# Patient Record
Sex: FEMALE | HISPANIC OR LATINO | Employment: UNEMPLOYED | URBAN - METROPOLITAN AREA
[De-identification: names, ages, dates, MRNs, and addresses within clinical notes are randomized per-mention and may not be internally consistent; named-entity substitution may affect disease eponyms.]

---

## 2022-06-13 LAB
C TRACH DNA SPEC QL PROBE+SIG AMP: NEGATIVE
N GONORRHOEA DNA SPEC QL PROBE+SIG AMP: NEGATIVE
PAP-ABSTRACT: NORMAL
SPECIMEN DESCRIP: NORMAL
SPECIMEN DESCRIPTION: NORMAL

## 2022-06-14 ENCOUNTER — TRANSFERRED RECORDS (OUTPATIENT)
Dept: HEALTH INFORMATION MANAGEMENT | Facility: CLINIC | Age: 22
End: 2022-06-14

## 2022-07-24 ENCOUNTER — HEALTH MAINTENANCE LETTER (OUTPATIENT)
Age: 22
End: 2022-07-24

## 2022-09-22 ENCOUNTER — LAB (OUTPATIENT)
Dept: LAB | Facility: CLINIC | Age: 22
End: 2022-09-22
Payer: COMMERCIAL

## 2022-09-22 ENCOUNTER — OFFICE VISIT (OUTPATIENT)
Dept: SURGERY | Facility: CLINIC | Age: 22
End: 2022-09-22

## 2022-09-22 VITALS
HEIGHT: 64 IN | WEIGHT: 233.6 LBS | SYSTOLIC BLOOD PRESSURE: 126 MMHG | DIASTOLIC BLOOD PRESSURE: 80 MMHG | BODY MASS INDEX: 39.88 KG/M2

## 2022-09-22 DIAGNOSIS — R53.83 FATIGUE, UNSPECIFIED TYPE: ICD-10-CM

## 2022-09-22 DIAGNOSIS — E66.01 MORBID OBESITY (H): ICD-10-CM

## 2022-09-22 DIAGNOSIS — E66.01 MORBID OBESITY (H): Primary | ICD-10-CM

## 2022-09-22 LAB
ALBUMIN SERPL BCG-MCNC: 4.5 G/DL (ref 3.5–5.2)
ALP SERPL-CCNC: 80 U/L (ref 35–104)
ALT SERPL W P-5'-P-CCNC: 27 U/L (ref 10–35)
ANION GAP SERPL CALCULATED.3IONS-SCNC: 12 MMOL/L (ref 7–15)
AST SERPL W P-5'-P-CCNC: 28 U/L (ref 10–35)
BILIRUB SERPL-MCNC: 0.3 MG/DL
BUN SERPL-MCNC: 13.1 MG/DL (ref 6–20)
CALCIUM SERPL-MCNC: 9 MG/DL (ref 8.6–10)
CHLORIDE SERPL-SCNC: 104 MMOL/L (ref 98–107)
CREAT SERPL-MCNC: 0.66 MG/DL (ref 0.51–0.95)
DEPRECATED HCO3 PLAS-SCNC: 22 MMOL/L (ref 22–29)
ERYTHROCYTE [DISTWIDTH] IN BLOOD BY AUTOMATED COUNT: 14 % (ref 10–15)
FERRITIN SERPL-MCNC: 13 NG/ML (ref 6–175)
FOLATE SERPL-MCNC: 5.8 NG/ML (ref 4.6–34.8)
GFR SERPL CREATININE-BSD FRML MDRD: >90 ML/MIN/1.73M2
GLUCOSE SERPL-MCNC: 95 MG/DL (ref 70–99)
HBA1C MFR BLD: 5.4 % (ref 0–5.6)
HCT VFR BLD AUTO: 37.5 % (ref 35–47)
HDLC SERPL-MCNC: 54 MG/DL
HGB BLD-MCNC: 12.4 G/DL (ref 11.7–15.7)
LDLC SERPL DIRECT ASSAY-MCNC: 69 MG/DL
MCH RBC QN AUTO: 27 PG (ref 26.5–33)
MCHC RBC AUTO-ENTMCNC: 33.1 G/DL (ref 31.5–36.5)
MCV RBC AUTO: 82 FL (ref 78–100)
PLATELET # BLD AUTO: 255 10E3/UL (ref 150–450)
POTASSIUM SERPL-SCNC: 4.1 MMOL/L (ref 3.4–5.3)
PROT SERPL-MCNC: 7.4 G/DL (ref 6.4–8.3)
PTH-INTACT SERPL-MCNC: 53 PG/ML (ref 15–65)
RBC # BLD AUTO: 4.6 10E6/UL (ref 3.8–5.2)
SODIUM SERPL-SCNC: 138 MMOL/L (ref 136–145)
TSH SERPL DL<=0.005 MIU/L-ACNC: 1.78 UIU/ML (ref 0.3–4.2)
VIT B12 SERPL-MCNC: 391 PG/ML (ref 232–1245)
WBC # BLD AUTO: 7.3 10E3/UL (ref 4–11)

## 2022-09-22 PROCEDURE — 84630 ASSAY OF ZINC: CPT | Mod: 90

## 2022-09-22 PROCEDURE — 83036 HEMOGLOBIN GLYCOSYLATED A1C: CPT

## 2022-09-22 PROCEDURE — 83718 ASSAY OF LIPOPROTEIN: CPT

## 2022-09-22 PROCEDURE — 83970 ASSAY OF PARATHORMONE: CPT

## 2022-09-22 PROCEDURE — 82306 VITAMIN D 25 HYDROXY: CPT

## 2022-09-22 PROCEDURE — 82746 ASSAY OF FOLIC ACID SERUM: CPT

## 2022-09-22 PROCEDURE — 84443 ASSAY THYROID STIM HORMONE: CPT

## 2022-09-22 PROCEDURE — 84590 ASSAY OF VITAMIN A: CPT | Mod: 90

## 2022-09-22 PROCEDURE — 83721 ASSAY OF BLOOD LIPOPROTEIN: CPT

## 2022-09-22 PROCEDURE — 99000 SPECIMEN HANDLING OFFICE-LAB: CPT

## 2022-09-22 PROCEDURE — 80053 COMPREHEN METABOLIC PANEL: CPT

## 2022-09-22 PROCEDURE — 85027 COMPLETE CBC AUTOMATED: CPT

## 2022-09-22 PROCEDURE — 99204 OFFICE O/P NEW MOD 45 MIN: CPT | Performed by: FAMILY MEDICINE

## 2022-09-22 PROCEDURE — 84425 ASSAY OF VITAMIN B-1: CPT | Mod: 90

## 2022-09-22 PROCEDURE — 82607 VITAMIN B-12: CPT

## 2022-09-22 PROCEDURE — 36415 COLL VENOUS BLD VENIPUNCTURE: CPT

## 2022-09-22 PROCEDURE — 82728 ASSAY OF FERRITIN: CPT

## 2022-09-22 NOTE — PATIENT INSTRUCTIONS
If you are planning to have your surgery at [facility], please complete this online video seminar series.    Throughout this six-video series, you will be introduced to some members of our bariatric team, gain a better understanding of what obesity is, learn about the surgery process, review the benefits and risks of surgery, and see a glimpse of life after surgery.      Completion time of all six videos is about 25 minutes and you can watch them each separately as time allows.     Start by clicking on the link below and keep scrolling down to the [facility] Weight Loss Surgery Video.    [email or web address]    Please let us know if you have any questions or issues.         Welcome to [facility]!      We are grateful that you have chosen us to partner with you on your journey to better health!  We have included some very important information for you to read as you begin your journey towards weight loss surgery. We will discuss parts of this as you move closer to surgery but please reach out if you have any questions or concerns.      Please click on the following links and they will lead you to a printable version of each handout or copy into your browser to view/print at home:    Making Your Decision, Understanding Weight Loss Surgery  [email or web address]    A Roadmap to Your Weight Loss Surgery  [email or web address]    Honoring Choices - Your Rights  [email or web address]    Honoring Choices - [place] Health Care Directive (form that can be filled out)  [email or web address]      Insurance Coverage and Approval for Surgery  Every insurance plan is different.  Many companies approve benefits for surgery, but many have specific requirements that must be completed prior to being approved.    Verification of benefits is the patient's responsibility.  You will be responsible for any charges not covered by your  insurance plan - including, but not limited to copays, deductible, out of pocket, any amount over your cap limit, etc.  Using the guideline below, please contact your insurance plan (we recommend you call the Customer Service number on the back of your card).      Once all of the requirements for surgery are complete, you will see the surgeon.  Following this visit, we will submit your information to your insurance plan for Prior Authorization.  We strongly encourage you to submit any documentation that supports your weight loss attempts to us.    Questions that are important to ask your insurance company when you call them:    Are [facility] in my network?  Is bariatric surgery a covered benefit under my policy?  If so, what is my estimated out of pocket expense?  Are there any exclusions or cap limits on my plan for bariatric surgery?  If so, what are they?  What are the criteria necessary to be approved for bariatric surgery?  Do you require medically supervised weight loss visits for approval of surgery?  If yes, for how many months?  Within the last # of years?  Are the following procedures a covered benefit under my plan?  Laparoscopic Gastric Bypass (CPT Code 51801)  Laparoscopic Sleeve Gastrectomy (CPT Code 19938)  Nutrition/Dietitian Consult (CPT Code 00123  Nutrition/Dietitian Reassessment (CPT Code 85336  Psychological Assment (CPT Codes 56253 & 59772      Initial labs for Bariatric Surgery    Some lab orders were placed by your doctor in the [facility] system and can be drawn at any of our outpatient hospital labs or your clinic. If you do them at one of three hospitals ([facility] in [place], [facility] in [place], [facility] in [place]) you do not need an appointment but if you'd like to do them at a clinic, you will need to schedule an appointment with that clinic.       You will need to be fasting 10-12 hours prior (you can continue to drink water) and should  have them drawn sooner verses later so if any corrections need to be made we will have time to address them.      [facility] lab is open 7 am - 5:30 pm Monday through Friday and 9am-12:30 pm on Saturdays.  [facility] is open 7 am -6:30 pm Monday through Friday.     If you would like them done at a clinic outside the [email or web address] system, then we will need to know where to fax the orders.   If you have any questions or would like help scheduling a lab appointment at the [facility], please give us a call on the Bariatric Nurse Line at [phone].        [facility] for Bariatric Surgery  Eat 3 nutrient-rich meals each day     Don't skip meals--it will cause you to overeat later in the day! Space meals 4-6 hours apart    Eat around the same times each day to develop a routine eating schedule    Avoid snacking unless physically hungry.   Planned snacks: 1-2 times per day and no more than 150 calories    Eating protein and fiber (vegetables/fruits/whole grains) with meals helps you stay full     Choose foods with less than 10 grams of sugar and 5-10 grams of fat per serving to prevent excess calories and weight gain  Eat protein first    Protein helps with healing, maintaining muscle mass and good nutrition, and reducing hunger     For RNY Gastric Bypass, Sleeve Gastrectomy, and Duodenal Switch: aim for 60-80 grams of protein per day    Eat protein first, then veggies and the fruits or grains  Stop drinking 15-30 minutes before meals and wait 30-45 minutes after eating to drink    Drinking too soon before a meal may cause you to be too full to eat    Drinking too soon after you eat will cause the food to  wash  through your new stomach too quickly--leaving you hungry and likely to eat more    Eat S-[id number]    Take 20-30 minutes to eat each meal by taking small bites, chewing foods to applesauce consistency or 20-30 times before you swallow    Not chewing food properly can block the  opening of your pouch--causing pain, nausea, vomiting    Eating foods too fast can delay those full signals and increase overeating   Slow down your eating by smaller plates/bowls, toddler utensils, putting your fork/spoon down between bites and not watching TV/computer during meals!  Make a list of activities to prevent boredom, stress and emotional eating    Go for a walk or lift weights while watching your favorite TV show    Talk to a co-worker or email/call a friend     Keep your hands and mind busy with woodworking, puzzles, knitting or painting your nails    Practice deep breathing or meditation  Drink 64 ounces of 0-Calorie drinks between meals     Water    Zero calorie Propel , Vitamin Water Zero  or SoBe Lifewater , Crystal Light , Sugar-Free Barry-Aid , and other sugar-free lemonade or flavored peck    Decaffeinated, unsweetened coffee/ tea (1 cup or less per day)   Avoid Caffeine and Carbonation- NO STRAWS    Caffeine can irritate your new pouch, increase risk for ulcers, and can increase your appetite      Carbonation can lead to increased bloating/gas and discomfort   Work up to a total of 30-60 minutes of physical activity most days of the week    Helps with losing weight and preventing weight regain after surgery     Do a combination of cardio (walking/water exercises/biking/swimming) and strength training  Take daily vitamin/mineral supplements after surgery    Daily use of vitamins/minerals is necessary for the rest of your life      Psychological Evaluation for Bariatric Surgery      Why do I need a psychological evaluation before bariatric surgery?     The psychologist's main purpose is to provide the support and education to ensure you have the most successful outcome after surgery.   Preparing for bariatric surgery often involves changing behavior patterns. Working on these changes before surgery allows you to achieve the best results after surgery as some of these behaviors have been  entrenched for many years. In addition, your relationship with food may need to change. Psychologists are experts in behavior change and are there to guide and support you as you make these important changes.   A significant life change, like losing a lot of weight, may affect many areas of your life--self-concept, physical activity and relationships with others. Your psychologist will help you prepare to adjust to these changes in a healthy way.   If you have mental health symptoms, relationship struggles, or other challenges, your psychologist will suggest how to best address these concerns so that they do not interfere with your progress toward a healthier lifestyle.       Is the psychologist looking for reasons to say I can't have surgery?   The goal is to not find specific psychological problems. Instead, the psychologist will be working with your strengths to ensure you have the most optimal outcome after surgery.  There is no expectation that you will have everything figured out already or that you must have  perfect  behaviors before moving forward with surgery. Your psychologist is expecting that you will have some behavior changes that will need to occur concurrent with the surgery.   You can feel comfortable that the psychologist is not there to    you or your habits. The psychologist is there to provide support and encourage your progress.      What should I expect during the evaluation?   During the interview, which could take place over 2 or more sessions, the psychologist will ask about:   -weight history, current eating pattern and fluid intake, and previous attempts at weight loss   -activity level   -psychiatric history  -drug, alcohol and nicotine use   -social and developmental history  -support system   -current stressors and coping mechanisms   -understanding of the risks of surgery   -expectations for outcomes after surgery   You will complete various questionnaires that will focus on  coping strategies, eating behaviors, quality of life and adverse childhood experiences in order to provide additional information to inform the assessment.   Your psychologist will likely give you some  homework assignments  to practice as you prepare for surgery. This will be individually tailored to your specific needs.   Your psychologist will talk with you about some of the typical challenges that patients might encounter after surgery and how to prepare for these.   A final report will be generated and any treatment recommendations will be discussed with you and the bariatric team to determine next steps.   If you would like, you may have any support people (e.g., spouse) join a session of the evaluation to provide additional information.       Bariatric surgery offers a physical  tool  for weight management. Similarly, your work with the psychologist will provide you with some additional emotional and behavioral  tools  as you work toward your healthier lifestyle goals.      Support Group    Support and accountability is an important part of your weight loss journey and maintenance once you reach your health goals.  Because of this, we require that you attend at least one of our support groups before you meet with the surgeon so you get a feel for what they are like and hopefully establish a connection to others who are going through a similar process or have had surgery before so you can ask your questions.    We currently have two options for support group but they are in the virtual format only at this time.  Both groups are using Microsoft Teams for their platform and you can access it through the web or an ramirez that can be downloaded to a smart phone if you have one.      The Pre- and Post-op Connections Group is on the second Tuesday of the month from 6:30-8pm and is hosted by Hira Live, PhD.  If you are interested in this group, you will need to email him at [email or web address] each month and  then he will in turn send you the invitation to join.      We also have a Post-op focused Connections Group the 4th Wednesday of the month from 11am-12pm that is mostly geared toward post-operative patients who are past three months post-op.  This group is hosted by Lety Brewer, DIMITRIN, CBN, CIC and you can email her to join the group at [email or web address] each month and she will send you an invite similar to Hira's group.  If you decide you would like to be a regular attender at this group, we can add you to an automatic invitation list of people.    You can see more information about available support groups that the [facility] offers to our patients as well by following the link:    The following Support Group information can also be found on our website:  [email or web address]      Please let us know if you have any questions about all the information above and we will be talking more about this during future visits.     Thank you,   [facility] Team      Bariatric Task List    Fax:  Please fax all paperwork to: [phone] -     Status:  Is patient a candidate for bariatric surgery?:  patient is a candidate for bariatric surgery -     Cleared to schedule surgeon consult?:  not cleared to schedule surgeon consult -     Status:  surgery evaluation in process -     Surgeon: Pia -        Insurance: Insurance:  [facility] -     Other:  Please call Jaky Wilson to connect on insurance requirements at [phone] -        Patient Info: Initial Weight:  233.6# -     Date of Initial Weight/Height:  9/22/2022 -     Goal Weight (lbs):  233 -     Surgery Type:  sleeve gastrectomy -        Dietician Visits: Structured weight loss required by insurance?:  no structured weight loss required -     Clearance from dietician to see surgeon?:  Needed -        Psychological Evaluation: Psych eval:  Needed -     Other:    -        Lab Work: Complete  "Blood Count:  Needed -     Comprehensive Metabolic Panel:  Needed -     Vitamin D:  Needed -     PTH:  Needed -     Hgb A1c:  Needed -      Lipids: Needed -      TSH (LAURA, SCA, [place] MA): Needed -       Ferritin: Needed -       Folate: Needed -     Thiamine: Needed -     Vitamin A: Needed -     Vitamin B12: Needed -     Zinc: Needed -     Other:    -        PCP: Establish care with PCP:  Completed -        Patient Education:  Information Session:  Completed -     Given \"Making your decision\" handout?:  Yes -     Given \"A Roadmap to you Weight Loss Surgery\" handout?: Yes -     Given support group information?:    -  Yes -   Attended support group?:  Needed - Must attend at least once before surgery   Support plan in place?:  Completed -     Research consents signed?:  research consent not signed -        Additional Surgery Requirements: Review Coag plan:  Completed - standard   Birth control plan:  Completed - IUD   Gallstone prevention plan (Actigall for 6 months postop): Needed -     Other: Send us a copy of the most recent pap smear to the fax [phone] -        Final Tasks:  Before surgery online class:  Needed - 3 weeks prior with RD and RN   After surgery online class:  Needed - 1 week after with RD   History and Physical per clinic:   -  Needed -  Within one month of surgery once scheduled   Final labs per clinic: Needed -  Within one month of surgery once scheduled   Chest xray per clinic:   -  Needed -  Within one month of surgery once scheduled   Electrocardiogram (ECG) per clinic:   -  Needed -  Within one month of surgery once scheduled   Other:   -        Notes:   -           "

## 2022-09-22 NOTE — LETTER
"    2022         RE: Nereida Torres  [address]  [place] [address]        Dear Colleague,    Thank you for referring your patient, Nereida Torres, to the [facility] [place]. Please see a copy of my visit note below.    New Bariatric Surgery Consultation Note    2022    RE: Nereida Torres  MR#: [id number]  : 2000      Referring provider: No flowsheet data found.    Chief Complaint/Reason for visit: evaluation for possible weight loss surgery  Dear Shila Reddy CNM,     I had the pleasure of seeing your patient, Nereida Torres, to evaluate her obesity and consider her for possible weight loss surgery. As you know, Nereida Torres is 22 year old.  She has a height of 5' 4\", a weight of 233 lbs 9.6 oz, and calculated Body mass index is 40.1 kg/m .        HISTORY OF PRESENT ILLNESS:  Weight Loss History Reviewed with Patient 2022   How long have you been overweight? Since early childhood   What is the most that you have ever weighed? 244   What is the most weight you have lost? 6   I have tried the following methods to lose weight Watching portions or calories, Exercise, Slimfast   Have you ever had weight loss surgery? No       CO-MORBIDITIES OF OBESITY INCLUDE:     2022   I have the following health issues associated with obesity: Stress Incontinence       PAST MEDICAL HISTORY:  Past Medical History:   Diagnosis Date     Fatigue      Low back pain      Morbid obesity (H)        PAST SURGICAL HISTORY:  Past Surgical History:   Procedure Laterality Date     C/SECTION, LOW TRANSVERSE       D & C      retained placenta     TONSILLECTOMY         FAMILY HISTORY:   Family History   Problem Relation Age of Onset     Seizure Disorder Mother      No Known Problems Father      No Known Problems Sister      Congenital Anomalies Sister      No Known Problems Brother      Asthma " Brother        SOCIAL HISTORY:   Social History Questions Reviewed With Patient 9/20/2022   Which best describes your employment status (select all that apply) 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 Yes   Who do you have in your support network that can be available to help you for the first 2 weeks after surgery? Mom and sister   Who can you count on for support throughout your weight loss surgery journey? Mom and sister   Can you afford 3 meals a day?  Yes   Can you afford 50-60 dollars a month for vitamins? Yes       HABITS:     9/20/2022   How often do you drink alcohol? Monthly or less   If you do drink alcohol, how many drinks might you have in a day? (one drink = 5 oz. wine, 1 can/bottle of beer, 1 shot liquor) 1 or 2   Have you ever used any of the following nicotine products? E-cigarettes   If you previously used any of these products, what year did you quit? 2020   Have you or are you currently using street drugs or prescription strength medication for which you do not have a prescription for? No   Do you have a history of chemical dependency (alcohol or drug abuse)? No       PSYCHOLOGICAL HISTORY:   Psychological History Reviewed With Patient 9/20/2022   Have you ever attempted suicide? Never.   Have you had thoughts of suicide in the past year? No   Have you ever been hospitalized for mental illness or a suicide attempt? Never.   Do you have a history of chronic pain? No   Have you ever been diagnosed with fibromyalgia? No   Are you currently seeing a therapist or counselor?  No   Are you currently seeing a psychiatrist? No       ROS:     9/20/2022   Skin:  Skin fold rashes (groin or other folds), Leg swelling   HEENT: Headaches, Dizziness/lightheadedness, Teeth, dentures, or bridges needing repairs   Musculoskeletal: Back pain   Cardiovascular: Chest pain, Shortness of breath with activity   Pulmonary: Shortness of  "breath with activity, Excessively sleep during the day   Gastrointestinal: Diarrhea, Constipation   Genitourinary: Stress incontinence (losing urine when coughing, sneezing, etc.)   Hematological: Clotting problems   Neurological: None of the above   Female only: Loss of menstrual cycles, Birth control       EATING BEHAVIORS:     9/20/2022   Have you or anyone else thought that you had an eating disorder? Yes   If you answered yes to the previous eating disorder question, select the types that apply from this list: Binge Eating   If you answered \"Other\" to the type of eating disorder question above, please describe what it is: Sometimes I just want to eat a lot   Do you currently binge eat (eat a large amount of food in a short time)? Yes   Are you an emotional eater? Yes   Do you get up to eat after falling asleep? Yes       EXERCISE:     9/20/2022   How often do you exercise? 1 to 2 times per week   What is the duration of your exercise (in minutes)? 30 Minutes   What types of exercise do you do? walking, home gym   What keeps you from being more active?  I am as active as I can possbily be, Shortness of breath, Too tired       MEDICATIONS:  No current outpatient medications on file.       ALLERGIES:  No Known Allergies    LABS/IMAGING/MEDICAL RECORDS REVIEWED    PHYSICAL EXAM:  /80   Ht 1.626 m (5' 4\")   Wt 106 kg (233 lb 9.6 oz)   BMI 40.10 kg/m    Pleasant and in no distress. Accompanied by her 18 yo sister  Mallampati 1+ Neck 15.25\"  Skin: acanthosis at the neckline, rash in umbulicus  Heart Regular  Lungs Clear  Abdomen 41.5\"  Extr: 2+ pulses, no edema  Euthymic  Alert and oriented  Gait normal      In summary, Nereida has low back pain and fatigue in the setting of morbid obesity. Her Body mass index is 40.1 kg/m . This satisfies NIH criteria for bariatric surgery. Once Nereida has been cleared by our bariatric psychologist and our bariatric dietitian, completed initial lab work, attended one " support group, and satisfied insurance mandated visits if applicable, she  will be scheduled with Dr. Palacio for Bariatric Surgery Consultation. She is interested in the Sleeve Gastrectomy. Nereida understands that routine health care maintenance will need to be up to date prior to surgery. she verbalizes understanding of the process to surgery and expectations for the postoperative period including the need for lifelong lifestyle changes, vitamin supplementation, and laboratory monitoring.       Weight will need to be 233# prior to submitting for insurance approval.  Standard DVT protocol  Ursodiol for 6 months after surgery to prevent gallstone formation  Nereida was reminded to prevent pregnancy for 18-24 months post operatively. She has Mirena IUD.          Sincerely,          Liset Bhagat MD     Total time spent on the date of this encounter doing: chart review, review of test results, patient visit, physical exam, education, counseling, developing plan of care, and documenting = 45  minutes.         Again, thank you for allowing me to participate in the care of your patient.        Sincerely,        Liset Bhagat MD

## 2022-09-22 NOTE — PROGRESS NOTES
"New Bariatric Surgery Consultation Note    2022    RE: Nereida Torres  MR#: [id number]  : 2000      Referring provider: No flowsheet data found.    Chief Complaint/Reason for visit: evaluation for possible weight loss surgery  Dear Shila Reddy CNM,     I had the pleasure of seeing your patient, Nereida Torres, to evaluate her obesity and consider her for possible weight loss surgery. As you know, Nereida Torres is 22 year old.  She has a height of 5' 4\", a weight of 233 lbs 9.6 oz, and calculated Body mass index is 40.1 kg/m .        HISTORY OF PRESENT ILLNESS:  Weight Loss History Reviewed with Patient 2022   How long have you been overweight? Since early childhood   What is the most that you have ever weighed? 244   What is the most weight you have lost? 6   I have tried the following methods to lose weight Watching portions or calories, Exercise, Slimfast   Have you ever had weight loss surgery? No       CO-MORBIDITIES OF OBESITY INCLUDE:     2022   I have the following health issues associated with obesity: Stress Incontinence       PAST MEDICAL HISTORY:  Past Medical History:   Diagnosis Date     Fatigue      Low back pain      Morbid obesity (H)        PAST SURGICAL HISTORY:  Past Surgical History:   Procedure Laterality Date     C/SECTION, LOW TRANSVERSE       D & C      retained placenta     TONSILLECTOMY         FAMILY HISTORY:   Family History   Problem Relation Age of Onset     Seizure Disorder Mother      No Known Problems Father      No Known Problems Sister      Congenital Anomalies Sister      No Known Problems Brother      Asthma Brother        SOCIAL HISTORY:   Social History Questions Reviewed With Patient 2022   Which best describes your employment status (select all that apply) 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 Yes   Who do you " have in your support network that can be available to help you for the first 2 weeks after surgery? Mom and sister   Who can you count on for support throughout your weight loss surgery journey? Mom and sister   Can you afford 3 meals a day?  Yes   Can you afford 50-60 dollars a month for vitamins? Yes       HABITS:     9/20/2022   How often do you drink alcohol? Monthly or less   If you do drink alcohol, how many drinks might you have in a day? (one drink = 5 oz. wine, 1 can/bottle of beer, 1 shot liquor) 1 or 2   Have you ever used any of the following nicotine products? E-cigarettes   If you previously used any of these products, what year did you quit? 2020   Have you or are you currently using street drugs or prescription strength medication for which you do not have a prescription for? No   Do you have a history of chemical dependency (alcohol or drug abuse)? No       PSYCHOLOGICAL HISTORY:   Psychological History Reviewed With Patient 9/20/2022   Have you ever attempted suicide? Never.   Have you had thoughts of suicide in the past year? No   Have you ever been hospitalized for mental illness or a suicide attempt? Never.   Do you have a history of chronic pain? No   Have you ever been diagnosed with fibromyalgia? No   Are you currently seeing a therapist or counselor?  No   Are you currently seeing a psychiatrist? No       ROS:     9/20/2022   Skin:  Skin fold rashes (groin or other folds), Leg swelling   HEENT: Headaches, Dizziness/lightheadedness, Teeth, dentures, or bridges needing repairs   Musculoskeletal: Back pain   Cardiovascular: Chest pain, Shortness of breath with activity   Pulmonary: Shortness of breath with activity, Excessively sleep during the day   Gastrointestinal: Diarrhea, Constipation   Genitourinary: Stress incontinence (losing urine when coughing, sneezing, etc.)   Hematological: Clotting problems   Neurological: None of the above   Female only: Loss of menstrual cycles, Birth control  "      EATING BEHAVIORS:     9/20/2022   Have you or anyone else thought that you had an eating disorder? Yes   If you answered yes to the previous eating disorder question, select the types that apply from this list: Binge Eating   If you answered \"Other\" to the type of eating disorder question above, please describe what it is: Sometimes I just want to eat a lot   Do you currently binge eat (eat a large amount of food in a short time)? Yes   Are you an emotional eater? Yes   Do you get up to eat after falling asleep? Yes       EXERCISE:     9/20/2022   How often do you exercise? 1 to 2 times per week   What is the duration of your exercise (in minutes)? 30 Minutes   What types of exercise do you do? walking, home gym   What keeps you from being more active?  I am as active as I can possbily be, Shortness of breath, Too tired       MEDICATIONS:  No current outpatient medications on file.       ALLERGIES:  No Known Allergies    LABS/IMAGING/MEDICAL RECORDS REVIEWED    PHYSICAL EXAM:  /80   Ht 1.626 m (5' 4\")   Wt 106 kg (233 lb 9.6 oz)   BMI 40.10 kg/m    Pleasant and in no distress. Accompanied by her 18 yo sister  Mallampati 1+ Neck 15.25\"  Skin: acanthosis at the neckline, rash in umbulicus  Heart Regular  Lungs Clear  Abdomen 41.5\"  Extr: 2+ pulses, no edema  Euthymic  Alert and oriented  Gait normal      In summary, Nereida has low back pain and fatigue in the setting of morbid obesity. Her Body mass index is 40.1 kg/m . This satisfies NIH criteria for bariatric surgery. Once Nereida has been cleared by our bariatric psychologist and our bariatric dietitian, completed initial lab work, attended one support group, and satisfied insurance mandated visits if applicable, she  will be scheduled with Dr. Palacio for Bariatric Surgery Consultation. She is interested in the Sleeve Gastrectomy. Nereida understands that routine health care maintenance will need to be up to date prior to surgery. she verbalizes " understanding of the process to surgery and expectations for the postoperative period including the need for lifelong lifestyle changes, vitamin supplementation, and laboratory monitoring.       Weight will need to be 233# prior to submitting for insurance approval.  Standard DVT protocol  Ursodiol for 6 months after surgery to prevent gallstone formation  Nereida was reminded to prevent pregnancy for 18-24 months post operatively. She has Mirena IUD.          Sincerely,          Liset Bhagat MD     Total time spent on the date of this encounter doing: chart review, review of test results, patient visit, physical exam, education, counseling, developing plan of care, and documenting = 45  minutes.

## 2022-09-23 LAB — DEPRECATED CALCIDIOL+CALCIFEROL SERPL-MC: 16 UG/L (ref 20–75)

## 2022-09-25 LAB — ZINC SERPL-MCNC: 70.5 UG/DL

## 2022-09-26 LAB
ANNOTATION COMMENT IMP: NORMAL
RETINYL PALMITATE SERPL-MCNC: 0.02 MG/L
VIT A SERPL-MCNC: 0.46 MG/L

## 2022-09-28 LAB — VIT B1 PYROPHOSHATE BLD-SCNC: 88 NMOL/L

## 2022-10-03 ENCOUNTER — HEALTH MAINTENANCE LETTER (OUTPATIENT)
Age: 22
End: 2022-10-03

## 2022-10-06 DIAGNOSIS — E53.8 LOW SERUM VITAMIN B12: Primary | ICD-10-CM

## 2022-10-06 DIAGNOSIS — R79.89 LOW SERUM VITAMIN D: ICD-10-CM

## 2022-10-06 RX ORDER — CHOLECALCIFEROL (VITAMIN D3) 125 MCG
5000 CAPSULE ORAL DAILY
Qty: 90 CAPSULE | Refills: 3 | Status: ON HOLD | OUTPATIENT
Start: 2022-10-06 | End: 2023-04-19

## 2022-10-21 ENCOUNTER — VIRTUAL VISIT (OUTPATIENT)
Dept: SURGERY | Facility: CLINIC | Age: 22
End: 2022-10-21
Payer: COMMERCIAL

## 2022-10-21 DIAGNOSIS — Z71.3 NUTRITIONAL COUNSELING: ICD-10-CM

## 2022-10-21 DIAGNOSIS — E66.01 CLASS 3 SEVERE OBESITY DUE TO EXCESS CALORIES WITHOUT SERIOUS COMORBIDITY WITH BODY MASS INDEX (BMI) OF 40.0 TO 44.9 IN ADULT (H): Primary | ICD-10-CM

## 2022-10-21 DIAGNOSIS — E66.813 CLASS 3 SEVERE OBESITY DUE TO EXCESS CALORIES WITHOUT SERIOUS COMORBIDITY WITH BODY MASS INDEX (BMI) OF 40.0 TO 44.9 IN ADULT (H): Primary | ICD-10-CM

## 2022-10-21 PROCEDURE — 97802 MEDICAL NUTRITION INDIV IN: CPT | Mod: 95 | Performed by: DIETITIAN, REGISTERED

## 2022-10-21 NOTE — PROGRESS NOTES
Nereida Torres is a 22 year old who is being evaluated via a billable video visit.      How would you like to obtain your AVS? MyChart  If the video visit is dropped, the invitation should be resent by: Send to e-mail at: [email or web address]  Will anyone else be joining your video visit? No      Video Start Time: 2:00 pm      Initial Structured Weight Loss Supervised Diet Evaluation     Assessment:  Nereida is being seen today for initial RD nutritional evaluation. Patient has been unsuccessful with non-surgical weight loss methods and is interested in bariatric surgery. Today we reviewed current eating habits and level of physical activity, and instructed on the changes that are required for successful bariatric outcomes.    Surgery of interest per pt: LSG.    Workflow review:  Support Group: Completed.  Psychology:In progress.  Lab work:Completed.  SWL:Yes 2nd Visit      Weight goal: At or below initial.    Anthropometrics:  Pt's weight is 233 lbs  Initial weight: 233.6 lbs  Weight change: 0  BMI: There is no height or weight on file to calculate BMI.   Ideal body weight: 54.7 kg (120 lb 9.5 oz)  Adjusted ideal body weight: 75.2 kg (165 lb 12.7 oz)    Estimated RMR ([place]-St Jeor equation):    1799 kcals x 1.3 (light active) = 2339 kcals (for weight maintenance)    Medical History:  Patient Active Problem List   Diagnosis     Morbid obesity (H)     Fatigue      Diabetes: No  HbA1c:  No results found for: HGBA1C      Nutrition History  Food allergies/intolerances/cultural or religous food customs: No     Vitamins/Mineral currently taking: Vitamin D, Vitamin B12    Socioeconomic Status  Who does the grocery shopping for your household? shared    Who prepares your meals at home? Self     Diet Recall/Time  Breakfast: eggs with tomatoes and onions, tortillas   Lunch: meat (chicken) with tomatoes and onions or steak and potatoes or rice, fruit  Dinner: similar to lunch     Typical Snacks: almonds  or cheese or fruit    Eating out: rarely     Beverages  Coke, Water, Sparkling Water, Coffee (0-1 times/day)-with sugar and cream/milk     Exercise  Walking up to 60 minutes 3-4 times/week     Nutrition Diagnosis (PES statement)  Overweight/Obesity (NC 3.3) related to overeating and poor lifestyle habits as evidenced by patient's subjective statements and BMI of 40.1 kg/m2.     Intervention    Nutrition Education:   1. Provided general overview of diet and lifestyle modifications needed to be a deemed a safe candidate for bariatric surgery.   2. Educated patient on how to read a food label: choosing foods with than 10 grams fat and 10 grams sugar per serving to avoid dumping syndrome.  3. Dumping Syndrome: Described the mechanisms of syndrome, symptoms, and prevention tools from a dietary perspective.   4. Vitamins: Educated on post-op vitamin regimen including MVI+ 18 mg Fe two times a day, calcium citrate 400-600 mg two times a day, 3310-9995 mcg sublingual B12 daily, 5000 IU vitamin D3 daily.     Food/Nutrient Delivery:  5. Educated patient on eating three meals, with cutting out snacking.  6. Bariatric Plate: Patient and I discussed the importance of including a lean protein source (20-30 grams/meal), vegetables (included at lunch and dinner), one serving (15g) of carbohydrate, and limited added fat (1 tb/day) at each meal.   7. Educated patient on using a protein powder drink as a meal replacement and/or supplement after bariatric surgery.   8. Discussed importance of adequate hydration after surgery, with goal of at least 64 oz of fluids/day.  9. Addressed avoiding all carbonated, caffeinated and sweetened drinks to prepare for bariatric surgery.     Nutrition Counseling:  10. Mindful eating techniques: Encouraged slow meal pace, chewing foods to applesauce consistency for 20-30 minutes/meal.   11. Discussed  fluids 30 minutes before, during, and after meal to prevent dumping syndrome and discomfort  post bariatric surgery.       Instructions/Goals:     1. Start implementing bariatric surgery lifestyle modifications.      Handouts Provided:   [facility] Bariatric Surgery Nutrition Info      Monitor/Evaluation:  Pt. s target weight: no gain from initial visit, patient verbalized understanding.     Plan for next visit:     (Final Supervised Diet visit with RD) pre/post-op  diet progression, give review of surgery process.      Video-Visit Details    Type of service:  Video Visit    Video End Time:2:22 PM    Originating Location (pt. Location): Home    Distant Location (provider location):  [facility] SUR[facility] CL[facility] AND BARIATRICS [facility]     Platform used for Video Visit: Rosalina Malagon RD

## 2022-10-21 NOTE — LETTER
10/21/2022         RE: Nereida Torres  [address]  [place] [address]        Dear Colleague,    Thank you for referring your patient, Nereida Torres, to the [facility] SURGERY CLINIC AND BARIATRICS CARE [place]. Please see a copy of my visit note below.    Nereida Torres is a 22 year old who is being evaluated via a billable video visit.      How would you like to obtain your AVS? MyChart  If the video visit is dropped, the invitation should be resent by: Send to e-mail at: [email or web address].  Will anyone else be joining your video visit? No      Video Start Time: 2:00 pm      Initial Structured Weight Loss Supervised Diet Evaluation     Assessment:  Nereida is being seen today for initial RD nutritional evaluation. Patient has been unsuccessful with non-surgical weight loss methods and is interested in bariatric surgery. Today we reviewed current eating habits and level of physical activity, and instructed on the changes that are required for successful bariatric outcomes.    Surgery of interest per pt: LSG.    Workflow review:  Support Group: Completed.  Psychology:In progress.  Lab work:Completed.  SWL:Yes 2nd Visit      Weight goal: At or below initial.    Anthropometrics:  Pt's weight is 233 lbs  Initial weight: 233.6 lbs  Weight change: 0  BMI: There is no height or weight on file to calculate BMI.   Ideal body weight: 54.7 kg (120 lb 9.5 oz)  Adjusted ideal body weight: 75.2 kg (165 lb 12.7 oz)    Estimated RMR ([place]-St Jeor equation):    1799 kcals x 1.3 (light active) = 2339 kcals (for weight maintenance)    Medical History:  Patient Active Problem List   Diagnosis     Morbid obesity (H)     Fatigue      Diabetes: No  HbA1c:  No results found for: HGBA1C      Nutrition History  Food allergies/intolerances/cultural or religous food customs: No     Vitamins/Mineral currently taking: Vitamin D, Vitamin B12    Socioeconomic Status  Who does the  grocery shopping for your household? shared    Who prepares your meals at home? Self     Diet Recall/Time  Breakfast: eggs with tomatoes and onions, tortillas   Lunch: meat (chicken) with tomatoes and onions or steak and potatoes or rice, fruit  Dinner: similar to lunch     Typical Snacks: almonds or cheese or fruit    Eating out: rarely     Beverages  Coke, Water, Sparkling Water, Coffee (0-1 times/day)-with sugar and cream/milk     Exercise  Walking up to 60 minutes 3-4 times/week     Nutrition Diagnosis (PES statement)  Overweight/Obesity (NC 3.3) related to overeating and poor lifestyle habits as evidenced by patient's subjective statements and BMI of 40.1 kg/m2.     Intervention    Nutrition Education:   1. Provided general overview of diet and lifestyle modifications needed to be a deemed a safe candidate for bariatric surgery.   2. Educated patient on how to read a food label: choosing foods with than 10 grams fat and 10 grams sugar per serving to avoid dumping syndrome.  3. Dumping Syndrome: Described the mechanisms of syndrome, symptoms, and prevention tools from a dietary perspective.   4. Vitamins: Educated on post-op vitamin regimen including MVI+ 18 mg Fe two times a day, calcium citrate 400-600 mg two times a day, 3684-9903 mcg sublingual B12 daily, 5000 IU vitamin D3 daily.     Food/Nutrient Delivery:  5. Educated patient on eating three meals, with cutting out snacking.  6. Bariatric Plate: Patient and I discussed the importance of including a lean protein source (20-30 grams/meal), vegetables (included at lunch and dinner), one serving (15g) of carbohydrate, and limited added fat (1 tb/day) at each meal.   7. Educated patient on using a protein powder drink as a meal replacement and/or supplement after bariatric surgery.   8. Discussed importance of adequate hydration after surgery, with goal of at least 64 oz of fluids/day.  9. Addressed avoiding all carbonated, caffeinated and sweetened drinks to  prepare for bariatric surgery.     Nutrition Counseling:  10. Mindful eating techniques: Encouraged slow meal pace, chewing foods to applesauce consistency for 20-30 minutes/meal.   11. Discussed  fluids 30 minutes before, during, and after meal to prevent dumping syndrome and discomfort post bariatric surgery.       Instructions/Goals:     1. Start implementing bariatric surgery lifestyle modifications.      Handouts Provided:   [facility] Bariatric Surgery Nutrition Info      Monitor/Evaluation:  Pt. s target weight: no gain from initial visit, patient verbalized understanding.     Plan for next visit:     (Final Supervised Diet visit with RD) pre/post-op  diet progression, give review of surgery process.      Video-Visit Details    Type of service:  Video Visit    Video End Time:2:22 PM    Originating Location (pt. Location): Home    Distant Location (provider location):  [facility] SURGERY CLINIC AND BARIATRICS CARE [place]     Platform used for Video Visit: Rosalina Malagon RD        Again, thank you for allowing me to participate in the care of your patient.        Sincerely,        Emilia Malagon RD

## 2022-11-03 ENCOUNTER — TELEPHONE (OUTPATIENT)
Dept: SURGERY | Facility: CLINIC | Age: 22
End: 2022-11-03

## 2022-11-03 NOTE — TELEPHONE ENCOUNTER
Left a message as she was not present on any telehealth platform. She called back and emailed this clinician to say she was unable to connect in time and that she needed to reschedule.

## 2022-11-17 ENCOUNTER — VIRTUAL VISIT (OUTPATIENT)
Dept: SURGERY | Facility: CLINIC | Age: 22
End: 2022-11-17
Payer: COMMERCIAL

## 2022-11-17 DIAGNOSIS — E66.01 MORBID OBESITY (H): Primary | ICD-10-CM

## 2022-11-17 NOTE — PROGRESS NOTES
Video-Visit Details    Type of service:  Video Visit    Video Start Time (time video started): 12:30 PM    Video End Time (time video stopped): 1:16 PM    Originating Location (pt. Location): Home    Distant Location (provider location):  Off-site    Mode of Communication:  Video Conference via Zoom for Healthcare    Physician has received verbal consent for a Video Visit from the patient? Yes    Nereida would like to follow through with VSG for health reasons. She has struggled with her weight for much of her life and now is concerned with various comorbidities. 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 She is a boredom eater. She has good knowledge of surgery and good support. She will follow up and complete psychological testing (Uzbek version of MMPI-3; English packet). F50.9; E66.01    Hira Live, PhD

## 2022-12-08 ENCOUNTER — VIRTUAL VISIT (OUTPATIENT)
Dept: SURGERY | Facility: CLINIC | Age: 22
End: 2022-12-08
Payer: COMMERCIAL

## 2022-12-08 ENCOUNTER — TRANSFERRED RECORDS (OUTPATIENT)
Dept: HEALTH INFORMATION MANAGEMENT | Facility: CLINIC | Age: 22
End: 2022-12-08

## 2022-12-08 DIAGNOSIS — E66.01 MORBID OBESITY (H): Primary | ICD-10-CM

## 2022-12-08 NOTE — LETTER
12/8/2022         RE: Nereida Torres  [address]  [place] [address]        Dear Colleague,    Thank you for referring your patient, Nereida Torres, to the [facility] SURGERY CLINIC AND BARIATRICS CARE [place]. Please see a copy of my visit note below.    Video-Visit Details    Type of service:  Video Visit    Video Start Time (time video started): 12:25 PM    Video End Time (time video stopped): 12:52 PM    Originating Location (pt. Location): Home    Distant Location (provider location):  Off-site    Mode of Communication:  Video Conference via Zoom for Healthcare    Physician has received verbal consent for a Video Visit from the patient? Yes    Nereida has made quality changes in eating and lifestyle, but still needs to be more mindful about her eating. She will follow up with a list of activities and mindful eating strategies. She also needs to complete the packet of questionnaires. F50.9; E66.01    Hira Live, PhD          Again, thank you for allowing me to participate in the care of your patient.        Sincerely,        Hira Live, PhD

## 2022-12-08 NOTE — PROGRESS NOTES
Video-Visit Details    Type of service:  Video Visit    Video Start Time (time video started): 12:25 PM    Video End Time (time video stopped): 12:52 PM    Originating Location (pt. Location): Home    Distant Location (provider location):  Off-site    Mode of Communication:  Video Conference via Zoom for Healthcare    Physician has received verbal consent for a Video Visit from the patient? Yes    Nereida has made quality changes in eating and lifestyle, but still needs to be more mindful about her eating. She will follow up with a list of activities and mindful eating strategies. She also needs to complete the packet of questionnaires. F50.9; E66.01    Hira Live, PhD

## 2022-12-21 ENCOUNTER — VIRTUAL VISIT (OUTPATIENT)
Dept: SURGERY | Facility: CLINIC | Age: 22
End: 2022-12-21
Payer: COMMERCIAL

## 2022-12-21 DIAGNOSIS — E66.813 CLASS 3 SEVERE OBESITY DUE TO EXCESS CALORIES WITHOUT SERIOUS COMORBIDITY WITH BODY MASS INDEX (BMI) OF 40.0 TO 44.9 IN ADULT (H): Primary | ICD-10-CM

## 2022-12-21 DIAGNOSIS — E66.01 CLASS 3 SEVERE OBESITY DUE TO EXCESS CALORIES WITHOUT SERIOUS COMORBIDITY WITH BODY MASS INDEX (BMI) OF 40.0 TO 44.9 IN ADULT (H): Primary | ICD-10-CM

## 2022-12-21 DIAGNOSIS — Z71.3 NUTRITIONAL COUNSELING: ICD-10-CM

## 2022-12-21 PROCEDURE — 97803 MED NUTRITION INDIV SUBSEQ: CPT | Mod: GT | Performed by: DIETITIAN, REGISTERED

## 2022-12-21 NOTE — LETTER
12/21/2022         RE: Nereida Torres  [address]  [place] [address]        Dear Colleague,    Thank you for referring your patient, Nereida Torres, to the [facility] SURGERY CLINIC AND BARIATRICS CARE [place]. Please see a copy of my visit note below.    Nereida Torres is a 22 year old who is being evaluated via a billable video visit.      How would you like to obtain your AVS? MyChart  If the video visit is dropped, the invitation should be resent by: Send to e-mail at: [email or web address]  Will anyone else be joining your video visit? No          Follow Up Surgical Weight Loss Supervised Diet Evaluation    Assessment:  Pt. is being seen today for a follow up RD nutritional evaluation. Pt. has been unsuccessful with non-surgical weight loss methods and is interested in bariatric surgery. Today we reviewed current eating habits and level of physical activity, and instructed on the changes that are required for successful bariatric outcomes.    Surgery of interest per pt: LSG.    Workflow review:  Support Group: Completed.  Psychology:In progress.  Lab work:Completed.  SWL:Yes 3rd Visit      Weight goal: At or below initial.    Anthropometrics:  Pt's weight is 233 lbs  Initial weight: 233.6 lbs   Weight change: 0  BMI: There is no height or weight on file to calculate BMI.   Ideal body weight: 54.7 kg (120 lb 9.5 oz)  Adjusted ideal body weight: 75.2 kg (165 lb 12.7 oz)    Estimated RMR (Woodson-St Jeor equation):  1799 kcals x 1.3 (light active) = 2339 kcals (for weight maintenance)    Medical History:  Patient Active Problem List   Diagnosis     Morbid obesity (H)     Fatigue      Diabetes: No  HbA1c:  No results found for: HGBA1C    Progress over past month: Continues to take a daily Vitamin D as well as a Vitamin B12 supplement. Has been focusing on protein first at each meal along with pushing water intake, trying to make it her primary beverage  of choice.     Diet Recall/Time  Breakfast: shredded chicken, salad (tomatoes, onions, and peppers)  Lunch: meat (steak or chicken) with veggies (maybe made into a soup)  Dinner: similar to lunch or tuna with lettuce (made into a wrap/taco)    Typical Snacks: string cheese or walnuts      Meal duration: doing well, focusing on chewing the food well.      fluids by 30 minutes before, during meal, and waiting 30 minutes after meal before drinking fluids: Yes-hard to adjust to, but continues to work on it    Beverages  Water-continues to work on increased consumption    Exercise  Dancing, Push-Ups 5 days/week for at least 30 minutes    PES statement:   Overweight/Obesity (NC 3.3) related to overeating and poor lifestyle habits as evidenced by patient's subjective statements and BMI of 40.1 kg/m2.     Intervention    Nutrition Education:   1. Provided general overview of diet and lifestyle modifications needed to be a deemed a safe candidate for bariatric surgery.     Food/Nutrient Delivery:  2. Educated patient on eating three meals, with cutting out snacking.  3. Bariatric Plate: Patient and I discussed the importance of including a lean protein source (20-30 grams/meal), vegetables (included at lunch and dinner), one serving (15g) of carbohydrate, and limited added fat (1 tb/day) at each meal.   4. Educated patient on using a protein powder drink as a meal replacement and/or supplement after bariatric surgery.   5. Discussed importance of adequate hydration after surgery, with goal of at least 64 oz of fluids/day.  6. Addressed avoiding all carbonated, caffeinated and sweetened drinks to prepare for bariatric surgery.     Nutrition Counselin. Mindful eating techniques: Encouraged slow meal pace, chewing foods to applesauce consistency for 20-30 minutes/meal.   8. Discussed  fluids 30 minutes before, during, and after meal to prevent dumping syndrome and discomfort post bariatric surgery.    9. Discussed pre/post operative diet progression, post op vitamin regimen, gave review of surgery process.     Instructions/Goals:     1. Continue implementing bariatric surgery lifestyle modifications.      Handouts Provided:   [facility] Bariatric Surgery Nutrition Info      Monitor/Evaluation:  Pt. s target weight:  no gain from initial visit, pt. verbalized understanding.     Pt has completed all nutrition requirements and is well-informed of the dietary and physical activity requirements that are necessary for successful bariatric outcomes. This pt is an appropriate candidate for surgery from a nutrition standpoint at this time. The patient understands that surgery is a tool, not a cure, and post operative follow up is essential.     Plan for next visit:   Post Op Nutrition       Video-Visit Details    Type of service:  Video Visit    Video Start Time (time video started): 8:14 am     Video End Time (time video stopped): 8:24 am    Originating Location (pt. Location): Home        Distant Location (provider location):  Off-site    Mode of Communication:  Video Conference via [facility]    Physician has received verbal consent for a Video Visit from the patient? Yes      Emilia Malagon RD              Again, thank you for allowing me to participate in the care of your patient.        Sincerely,        Emilia Malagon RD

## 2022-12-21 NOTE — PROGRESS NOTES
Nereida BULL Young Torres is a 22 year old who is being evaluated via a billable video visit.      How would you like to obtain your AVS? MyChart  If the video visit is dropped, the invitation should be resent by: Send to e-mail at: [email or web address]  Will anyone else be joining your video visit? No          Follow Up Surgical Weight Loss Supervised Diet Evaluation    Assessment:  Pt. is being seen today for a follow up RD nutritional evaluation. Pt. has been unsuccessful with non-surgical weight loss methods and is interested in bariatric surgery. Today we reviewed current eating habits and level of physical activity, and instructed on the changes that are required for successful bariatric outcomes.    Surgery of interest per pt: LSG.    Workflow review:  Support Group: Completed.  Psychology:In progress.  Lab work:Completed.  SWL:Yes 3rd Visit      Weight goal: At or below initial.    Anthropometrics:  Pt's weight is 233 lbs  Initial weight: 233.6 lbs   Weight change: 0  BMI: There is no height or weight on file to calculate BMI.   Ideal body weight: 54.7 kg (120 lb 9.5 oz)  Adjusted ideal body weight: 75.2 kg (165 lb 12.7 oz)    Estimated RMR (Vance-St Jeor equation):  1799 kcals x 1.3 (light active) = 2339 kcals (for weight maintenance)    Medical History:  Patient Active Problem List   Diagnosis     Morbid obesity (H)     Fatigue      Diabetes: No  HbA1c:  No results found for: HGBA1C    Progress over past month: Continues to take a daily Vitamin D as well as a Vitamin B12 supplement. Has been focusing on protein first at each meal along with pushing water intake, trying to make it her primary beverage of choice.     Diet Recall/Time  Breakfast: shredded chicken, salad (tomatoes, onions, and peppers)  Lunch: meat (steak or chicken) with veggies (maybe made into a soup)  Dinner: similar to lunch or tuna with lettuce (made into a wrap/taco)    Typical Snacks: string cheese or walnuts      Meal  duration: doing well, focusing on chewing the food well.      fluids by 30 minutes before, during meal, and waiting 30 minutes after meal before drinking fluids: Yes-hard to adjust to, but continues to work on it    Beverages  Water-continues to work on increased consumption    Exercise  Dancing, Push-Ups 5 days/week for at least 30 minutes    PES statement:   Overweight/Obesity (NC 3.3) related to overeating and poor lifestyle habits as evidenced by patient's subjective statements and BMI of 40.1 kg/m2.     Intervention    Nutrition Education:   1. Provided general overview of diet and lifestyle modifications needed to be a deemed a safe candidate for bariatric surgery.     Food/Nutrient Delivery:  2. Educated patient on eating three meals, with cutting out snacking.  3. Bariatric Plate: Patient and I discussed the importance of including a lean protein source (20-30 grams/meal), vegetables (included at lunch and dinner), one serving (15g) of carbohydrate, and limited added fat (1 tb/day) at each meal.   4. Educated patient on using a protein powder drink as a meal replacement and/or supplement after bariatric surgery.   5. Discussed importance of adequate hydration after surgery, with goal of at least 64 oz of fluids/day.  6. Addressed avoiding all carbonated, caffeinated and sweetened drinks to prepare for bariatric surgery.     Nutrition Counselin. Mindful eating techniques: Encouraged slow meal pace, chewing foods to applesauce consistency for 20-30 minutes/meal.   8. Discussed  fluids 30 minutes before, during, and after meal to prevent dumping syndrome and discomfort post bariatric surgery.   9. Discussed pre/post operative diet progression, post op vitamin regimen, gave review of surgery process.     Instructions/Goals:     1. Continue implementing bariatric surgery lifestyle modifications.      Handouts Provided:   [facility] Bariatric Surgery Nutrition  Info      Monitor/Evaluation:  Pt. s target weight:  no gain from initial visit, pt. verbalized understanding.     Pt has completed all nutrition requirements and is well-informed of the dietary and physical activity requirements that are necessary for successful bariatric outcomes. This pt is an appropriate candidate for surgery from a nutrition standpoint at this time. The patient understands that surgery is a tool, not a cure, and post operative follow up is essential.     Plan for next visit:   Post Op Nutrition       Video-Visit Details    Type of service:  Video Visit    Video Start Time (time video started): 8:14 am     Video End Time (time video stopped): 8:24 am    Originating Location (pt. Location): Home        Distant Location (provider location):  Off-site    Mode of Communication:  Video Conference via [facility]    Physician has received verbal consent for a Video Visit from the patient? Yes      Emilia Malagon, RD

## 2022-12-22 ENCOUNTER — VIRTUAL VISIT (OUTPATIENT)
Dept: SURGERY | Facility: CLINIC | Age: 22
End: 2022-12-22
Payer: COMMERCIAL

## 2022-12-22 DIAGNOSIS — E66.01 MORBID OBESITY (H): Primary | ICD-10-CM

## 2022-12-22 NOTE — PROGRESS NOTES
Video-Visit Details    Type of service:  Video Visit    Video Start Time (time video started): 10 AM    Video End Time (time video stopped): 10:23 AM    Originating Location (pt. Location): Home    Distant Location (provider location):  Off-site    Mode of Communication:  Video Conference via [facility]    Nereida has made quality changes in eating and lifestyle and appears more mindful about her eating. She is now ready to proceed with surgery. A report was sent to the clinic. F50.9; E66.01    Hira Live, PhD

## 2022-12-22 NOTE — LETTER
12/22/2022         RE: Nereida Torres  [address]  [place] [address]        Dear Colleague,    Thank you for referring your patient, Nereida Torres, to the [facility] SURGERY CLINIC AND BARIATRICS CARE [place]. Please see a copy of my visit note below.    Video-Visit Details    Type of service:  Video Visit    Video Start Time (time video started): 10 AM    Video End Time (time video stopped): 10:23 AM    Originating Location (pt. Location): Home    Distant Location (provider location):  Off-site    Mode of Communication:  Video Conference via [facility]    Nereida has made quality changes in eating and lifestyle and appears more mindful about her eating. She is now ready to proceed with surgery. A report was sent to the clinic. F50.9; E66.01    Hira Live, PhD          Again, thank you for allowing me to participate in the care of your patient.        Sincerely,        Hira Live, PhD

## 2022-12-30 ENCOUNTER — TELEPHONE (OUTPATIENT)
Dept: SURGERY | Facility: CLINIC | Age: 22
End: 2022-12-30

## 2022-12-30 NOTE — TELEPHONE ENCOUNTER
Left a message requesting she fax us a copy of her updated pap.  Once we receive this she can have her surgical consult

## 2023-01-31 ENCOUNTER — TELEPHONE (OUTPATIENT)
Dept: SURGERY | Facility: CLINIC | Age: 23
End: 2023-01-31
Payer: COMMERCIAL

## 2023-01-31 NOTE — TELEPHONE ENCOUNTER
I was able to speak with patient along with an  to let patient know that she needs an updated pap smear.  I have let her know that she can request to have done this through her PCP.

## 2023-03-02 ENCOUNTER — OFFICE VISIT (OUTPATIENT)
Dept: SURGERY | Facility: CLINIC | Age: 23
End: 2023-03-02
Payer: COMMERCIAL

## 2023-03-02 VITALS
SYSTOLIC BLOOD PRESSURE: 110 MMHG | HEIGHT: 64 IN | WEIGHT: 237 LBS | DIASTOLIC BLOOD PRESSURE: 70 MMHG | BODY MASS INDEX: 40.46 KG/M2

## 2023-03-02 DIAGNOSIS — E66.01 CLASS 3 SEVERE OBESITY DUE TO EXCESS CALORIES WITHOUT SERIOUS COMORBIDITY WITH BODY MASS INDEX (BMI) OF 40.0 TO 44.9 IN ADULT (H): Primary | ICD-10-CM

## 2023-03-02 DIAGNOSIS — E66.813 CLASS 3 SEVERE OBESITY DUE TO EXCESS CALORIES WITHOUT SERIOUS COMORBIDITY WITH BODY MASS INDEX (BMI) OF 40.0 TO 44.9 IN ADULT (H): Primary | ICD-10-CM

## 2023-03-02 PROCEDURE — 99204 OFFICE O/P NEW MOD 45 MIN: CPT | Performed by: SURGERY

## 2023-03-02 RX ORDER — ACETAMINOPHEN 325 MG/1
975 TABLET ORAL ONCE
Status: CANCELLED | OUTPATIENT
Start: 2023-04-18 | End: 2023-03-02

## 2023-03-02 RX ORDER — HEPARIN SODIUM 5000 [USP'U]/.5ML
5000 INJECTION, SOLUTION INTRAVENOUS; SUBCUTANEOUS ONCE
Status: CANCELLED | OUTPATIENT
Start: 2023-04-18 | End: 2023-03-02

## 2023-03-02 RX ORDER — CELECOXIB 100 MG/1
400 CAPSULE ORAL
Status: CANCELLED | OUTPATIENT
Start: 2023-03-02

## 2023-03-02 RX ORDER — CEFAZOLIN SODIUM/WATER 2 G/20 ML
2 SYRINGE (ML) INTRAVENOUS
Status: CANCELLED | OUTPATIENT
Start: 2023-04-18

## 2023-03-02 RX ORDER — GABAPENTIN 100 MG/1
600 CAPSULE ORAL
Status: CANCELLED | OUTPATIENT
Start: 2023-03-02

## 2023-03-02 RX ORDER — CEFAZOLIN SODIUM/WATER 2 G/20 ML
2 SYRINGE (ML) INTRAVENOUS SEE ADMIN INSTRUCTIONS
Status: CANCELLED | OUTPATIENT
Start: 2023-04-18

## 2023-03-02 RX ORDER — ACETAMINOPHEN 325 MG/1
2 TABLET ORAL EVERY 6 HOURS PRN
Status: ON HOLD | COMMUNITY
Start: 2021-07-01 | End: 2023-04-19

## 2023-03-02 RX ORDER — ONDANSETRON 2 MG/ML
4 INJECTION INTRAMUSCULAR; INTRAVENOUS
Status: CANCELLED | OUTPATIENT
Start: 2023-04-18

## 2023-03-02 NOTE — LETTER
3/2/2023         RE: Nereida Torres  [address]  [place] [address]        Dear Colleague,    Thank you for referring your patient, Nereida Torres, to the [facility] SURGERY CLINIC AND BARIATRICS CARE [place]. Please see a copy of my visit note below.    HPI: Nereida Torres is a 22 year old female here today for consideration of metabolic and bariatric surgery. She is referred by Shila Reddy.  She has struggled with obesity for the past 15 years, noting difficulties with obesity through middle school and high school.  She states that she has never had success with self-directed low calorie diets, commercial product such as Slim fast.  While she has tried to lose weight with increased exercise and activity, she states that her obesity makes it harder to exercise and is impediments to doing more.  She became interested in bariatric surgery following the birth of her son who is now a year and a half old.  She hopes to gain increased ability to engage in physical activity following surgery.  She notes that she has already implemented some of the healthier eating and drinking habits required for success after surgery, and feel that these changes are something she can maintain as a permanent lifestyle adaptation..     Her bariatric comorbidities include fatigue.    Bariatric comorbidities not present include type 2 diabetes, hypertension, sleep apnea, GERD.      Allergies:Patient has no known allergies.    Past Medical History:   Diagnosis Date     Fatigue      Low back pain      Morbid obesity (H)        Past Surgical History:   Procedure Laterality Date     C/SECTION, LOW TRANSVERSE       D & C      retained placenta     TONSILLECTOMY         CURRENT MEDS:  Prior to Admission medications    Medication Sig Start Date End Date Taking? Authorizing Provider   acetaminophen (TYLENOL) 325 MG tablet Take 2 tablets by mouth every 6 hours as needed 7/1/21  Yes Reported,  "Patient   Cholecalciferol (VITAMIN D) 125 MCG (5000 UT) capsule Take 1 capsule (5,000 Units) by mouth daily 10/6/22 10/6/23 Yes Liset Burton MD   cyanocobalamin (VITAMIN B-12) 1000 MCG SUBL sublingual tablet Place 1 tablet (1,000 mcg) under the tongue daily 10/6/22 10/6/23 Yes Liset Burton MD         Social Connections: Not on file       Family History   Problem Relation Age of Onset     Seizure Disorder Mother      No Known Problems Father      No Known Problems Sister      Congenital Anomalies Sister      No Known Problems Brother      Asthma Brother         reports that she has never smoked. She has never used smokeless tobacco. She reports current alcohol use. She reports that she does not use drugs.    History   Smoking Status     Never   Smokeless Tobacco     Never       Review of Systems -  A complete ROS was reviewed and except for what is listed in the HPI above, all others are negative  PSYCHIATRIC: She has undergone a lifestyle assessment and has been deemed a good candidate for bariatric surgery by the psychologist.    /70   Ht 1.626 m (5' 4\")   Wt 107.5 kg (237 lb)   Breastfeeding No   BMI 40.68 kg/m      Wt Readings from Last 4 Encounters:   03/02/23 107.5 kg (237 lb)   09/22/22 106 kg (233 lb 9.6 oz)        Body mass index is 40.68 kg/m .    EXAM:  GENERAL: This is a well-developed 22 year old female who appears her stated age  HEAD & NECK: Grossly normal.  No visible goiter or scars  CARDIAC: Regular rate and rhythm  CHEST/LUNG: Normal respiratory effort  ABDOMEN: Obese.  No visible hernias or masses appreciated.  LYMPHATIC:  No significant adenopathy visualized.    EXTREMITIES: Grossly normal.  No evidence of chronic venous stasis.    NEUROLOGIC: Focally intact  INTEGUMENT: No open lesions or ulcers appreciated visually  PSYCHIATRIC: Normal affect. She has a good grasp on the nature of her obesity and the treatment options.    LABS:      Assessment/Plan: 22 year " old female who is an excellent candidate for bariatric and metabolic surgery.  After a careful conversation with the patient it was decided that a laparoscopic sleeve gastrectomy would be her best option.       I went over the surgery in detail with her.  I went over the nature of the operation and some of the potential consequences of the surgery.  I went over the expected hospital course and discussed laparoscopic versus open surgery, understanding that we will plan on doing this laparoscopically with the possibility of having to convert to an open operation.  I went over some of the risks and complications of the operation including, but not limited to, DVT, pulmonary emboli, pneumonia, postoperative bleeding, wound infection, staple line leak, intra-abdominal sepsis, and possible death.  I also went over some of the potential nutritional concerns such as vitamin B-12, iron, vitamin D, vitamin A, calcium and protein deficiencies.  I will also went over the need for lifelong nutritional surveillance.  This includes our regular scheduled follow up of a 1 week post op dietician visit, 2 week post op surgeon visit, an then additional dietician visits at 1 month, 3 months, and 9 months.  They are to follow up with a physician in our program at 6 months and 1 year, and annual thereafter.  The patient understands and wants to proceed with surgery.  We will submit for prior authorization.      Randall Palacio MD ,MD  [facility] Department of Surgery      Again, thank you for allowing me to participate in the care of your patient.        Sincerely,        Randall Palacio MD

## 2023-03-02 NOTE — PATIENT INSTRUCTIONS
[facility] Informed Consent for Bariatric Surgery from the clinic was given to the patient, reviewed, signed and sent for scanning.       Tiffanie Kinney CMA  [facility]  [place] [facility] [place]  [facility] [place] [address] [address]  Office: [phone]  Fax: [phone]

## 2023-03-02 NOTE — PROGRESS NOTES
HPI: Nereida Torres is a 22 year old female here today for consideration of metabolic and bariatric surgery. She is referred by Shila Reddy.  She has struggled with obesity for the past 15 years, noting difficulties with obesity through middle school and high school.  She states that she has never had success with self-directed low calorie diets, commercial product such as Slim fast.  While she has tried to lose weight with increased exercise and activity, she states that her obesity makes it harder to exercise and is impediments to doing more.  She became interested in bariatric surgery following the birth of her son who is now a year and a half old.  She hopes to gain increased ability to engage in physical activity following surgery.  She notes that she has already implemented some of the healthier eating and drinking habits required for success after surgery, and feel that these changes are something she can maintain as a permanent lifestyle adaptation..     Her bariatric comorbidities include fatigue.    Bariatric comorbidities not present include type 2 diabetes, hypertension, sleep apnea, GERD.      Allergies:Patient has no known allergies.    Past Medical History:   Diagnosis Date     Fatigue      Low back pain      Morbid obesity (H)        Past Surgical History:   Procedure Laterality Date     C/SECTION, LOW TRANSVERSE       D & C      retained placenta     TONSILLECTOMY         CURRENT MEDS:  Prior to Admission medications    Medication Sig Start Date End Date Taking? Authorizing Provider   acetaminophen (TYLENOL) 325 MG tablet Take 2 tablets by mouth every 6 hours as needed 7/1/21  Yes Reported, Patient   Cholecalciferol (VITAMIN D) 125 MCG (5000 UT) capsule Take 1 capsule (5,000 Units) by mouth daily 10/6/22 10/6/23 Yes Liset Burton MD   cyanocobalamin (VITAMIN B-12) 1000 MCG SUBL sublingual tablet Place 1 tablet (1,000 mcg) under the tongue daily 10/6/22 10/6/23 Yes Abdulaziz  "Liset Bhagat MD         Social Connections: Not on file       Family History   Problem Relation Age of Onset     Seizure Disorder Mother      No Known Problems Father      No Known Problems Sister      Congenital Anomalies Sister      No Known Problems Brother      Asthma Brother         reports that she has never smoked. She has never used smokeless tobacco. She reports current alcohol use. She reports that she does not use drugs.    History   Smoking Status     Never   Smokeless Tobacco     Never       Review of Systems -  A complete ROS was reviewed and except for what is listed in the HPI above, all others are negative  PSYCHIATRIC: She has undergone a lifestyle assessment and has been deemed a good candidate for bariatric surgery by the psychologist.    /70   Ht 1.626 m (5' 4\")   Wt 107.5 kg (237 lb)   Breastfeeding No   BMI 40.68 kg/m      Wt Readings from Last 4 Encounters:   03/02/23 107.5 kg (237 lb)   09/22/22 106 kg (233 lb 9.6 oz)        Body mass index is 40.68 kg/m .    EXAM:  GENERAL: This is a well-developed 22 year old female who appears her stated age  HEAD & NECK: Grossly normal.  No visible goiter or scars  CARDIAC: Regular rate and rhythm  CHEST/LUNG: Normal respiratory effort  ABDOMEN: Obese.  No visible hernias or masses appreciated.  LYMPHATIC:  No significant adenopathy visualized.    EXTREMITIES: Grossly normal.  No evidence of chronic venous stasis.    NEUROLOGIC: Focally intact  INTEGUMENT: No open lesions or ulcers appreciated visually  PSYCHIATRIC: Normal affect. She has a good grasp on the nature of her obesity and the treatment options.    LABS:      Assessment/Plan: 22 year old female who is an excellent candidate for bariatric and metabolic surgery.  After a careful conversation with the patient it was decided that a laparoscopic sleeve gastrectomy would be her best option.       I went over the surgery in detail with her.  I went over the nature of the operation " and some of the potential consequences of the surgery.  I went over the expected hospital course and discussed laparoscopic versus open surgery, understanding that we will plan on doing this laparoscopically with the possibility of having to convert to an open operation.  I went over some of the risks and complications of the operation including, but not limited to, DVT, pulmonary emboli, pneumonia, postoperative bleeding, wound infection, staple line leak, intra-abdominal sepsis, and possible death.  I also went over some of the potential nutritional concerns such as vitamin B-12, iron, vitamin D, vitamin A, calcium and protein deficiencies.  I will also went over the need for lifelong nutritional surveillance.  This includes our regular scheduled follow up of a 1 week post op dietician visit, 2 week post op surgeon visit, an then additional dietician visits at 1 month, 3 months, and 9 months.  They are to follow up with a physician in our program at 6 months and 1 year, and annual thereafter.  The patient understands and wants to proceed with surgery.  We will submit for prior authorization.      Randall Palacio MD ,MD  [facility] Department of Surgery

## 2023-03-03 ENCOUNTER — DOCUMENTATION ONLY (OUTPATIENT)
Dept: SURGERY | Facility: CLINIC | Age: 23
End: 2023-03-03
Payer: COMMERCIAL

## 2023-03-03 NOTE — PROGRESS NOTES
I have submitted a PA to [facility] for this patient to get approval of a LSG with Dr. Randall Palacio

## 2023-03-09 ENCOUNTER — DOCUMENTATION ONLY (OUTPATIENT)
Dept: SURGERY | Facility: CLINIC | Age: 23
End: 2023-03-09
Payer: COMMERCIAL

## 2023-03-09 ENCOUNTER — APPOINTMENT (OUTPATIENT)
Dept: INTERPRETER SERVICES | Facility: CLINIC | Age: 23
End: 2023-03-09
Payer: COMMERCIAL

## 2023-03-09 NOTE — PROGRESS NOTES
Nereida is scheduled for a LSG (06025) with Dr. Randall Palacio on Tuesday, April 18, 2023 @ 9:00 am.  Scheduled for pre op class on Wednesday, March 29, 2023 @ 12:30 pm.  She will have her pre op and testing done at [facility].    [facility] # [id number]  03/03/2023-09/02/2023

## 2023-03-09 NOTE — PROGRESS NOTES
Pre-op Class Checklist:    Initial Wt: 233 lb  AB Visit:    Wt Readings from Last 1 Encounters:   03/02/23 107.5 kg (237 lb)     Hemoglobin A1C   Date Value Ref Range Status   09/22/2022 5.4 0.0 - 5.6 % Final     Comment:     Normal <5.7%   Prediabetes 5.7-6.4%    Diabetes 6.5% or higher     Note: Adopted from ADA consensus guidelines.      CPAP: No   Smoking Cessation Date: Never smoker  Urine Nicotine Screen: N/A   Support Group: Yes   Anticoag Risk: Standard   Actigall: Need   Omeprazole: Need   Consent: Complete  Pharmacy: [id number] [address]  AT [facility]  Bariatrician: Liset Bhagat MD   Post-op appointment with Hira: No     Tasklist updated.    Sharee Vazquez RN, CBN  [facility]  P [phone]  F [phone]

## 2023-03-20 LAB
ALT SERPL-CCNC: 17 U/L (ref 6–29)
AST SERPL-CCNC: 16 U/L (ref 10–30)
CREATININE (EXTERNAL): 0.68 MG/DL (ref 0.5–0.96)
GFR ESTIMATED (EXTERNAL): 125 ML/MIN/1.73M2
GLUCOSE (EXTERNAL): 82 MG/DL (ref 65–99)
INR (EXTERNAL): 1 (ref 0.9–1.1)
POTASSIUM (EXTERNAL): 4.1 MMOL/L (ref 3.5–5.3)

## 2023-03-27 NOTE — PROGRESS NOTES
Patient attended group class to review pre-op instructions for upcoming surgery.    Discussed admission process, COVID restrictions and hospital course.  Pharmacy information packet given and explained. Patient was given exercises to work on post-op for maintaining muscle mass and strengthening.  Bariatric quiz reviewed in class. Discharge instructions and follow up appointments given and reviewed with pt at this time and patient verbalized understanding. She will have her H&P at [facility] in [facility] on 3/30/2023 and do her pre-op testing at that visit. Orders given to pt at class to take to her appointment.  Prescriptions for Omeprazole and Actigall as well as vitamins sent to the patient's pharmacy to be started after surgery.      Sharee Vazquez RN, CBN  [facility]  P [phone]  F [phone]

## 2023-03-27 NOTE — PATIENT INSTRUCTIONS
Patient name: Nereida Torres  Surgery:  Sleeve Gastrectomy  Surgery Date:  4/18/2023  Surgery Time: 9:00 am (*This time is just a tentative time and may end up changing.*)  Arrival Time to Hospital: 7:00 am (two hours prior to surgery time)  Surgeon: Randall Palacio MD       MONTH BEFORE SURGERY    Schedule and have Pre-operative History and Physical with your primary care in addition to the labs, CXR and EKG.  These should be completed within a month of surgery and no closer than 5 days.  ALL of the following tests must be completed per anesthesia and your surgeon prior to your surgery:    Chest Xray  EKG  Complete Blood Count with Differential  Complete Metabolic Panel  INR  APTT(PTT)  Urine Analysis (UA) with Urine Culture if UA abnormal  Hemaglobin A1c if hasn't been done in the last 3 months AND you are diabetic.  Urine Cotinine with Metabolites if you quit smoking within the last 6 months.    *  COVID testing- is no longer required prior to your surgery.  However, please let us know if you aren't feeling well or have tested positive for Covid-19 between now and your surgery date. Let us know if you have any questions regarding this.      2 WEEKS BEFORE SURGERY    Start Preoperative Liquid Diet per dietitian instructions      WEEK BEFORE SURGERY CHECKLIST       1. Continue Full Liquid Diet per dietitian instructions    2.   Purchase diet components for after surgery      3.   Arrange for someone to stay with you the first 1-2 nights you return home.     4.   Arrange for a ride home from the hospital.  We can't give an exact time for  because it depends on how you are doing with your drinking and pain control.  Most people are reaching their goals for discharge by lunch time and you will need to have a ride ready and waiting by 11 am.    5.   Do your grocery/vitamin shopping for before AND after surgery.    6.   Make sure you have a bowel movement if you haven t gone in a while. There is no  need for a bowel prep before surgery.       7.   Make sure you have picked up the prescriptions/supplements that were sent to your pharmacy - [id number] - [address]  AT [facility], [facility]     NEW PRESCRIPTIONS:  In preparation for surgery, we have prescribed some medication that you will need to  as soon as possible     A. Vitamins: Chewable Multivitamin and Vitamin B12 (if you weren't taking already)  - You can start taking the Multivitamin and B12 as soon as you pick this up from the pharmacy.  Some insurance companies will not cover the vitamins because they are available over the counter, so in those cases you will need to purchase them yourselves.  Do not take the morning of surgery.    B. Acid Reducer:  Omeprazole (Prilosec) - If not already taking, you will get a prescription to start when you get home from the hospital.  Tablets cannot be cut or crushed.  If a capsule, entire capsule contents may be sprinkled on a spoonful of applesauce and taken immediately without chewing.  If only on a full liquid diet, dump capsule contents into a spoonful of liquid and take without chewing.  May swallow whole again starting 6 weeks after surgery but can try swallowing sooner if having issues with opening into food.  Continue after surgery for at least 3 months even without symptoms of acid reflux. Do not take the morning of surgery.     C. Gallstone Reduction: Actigall (if needed) - Start two weeks after surgery.  Swallow whole with warm water, do not cut, crush, or open capsule up.  This is a medication that will help to prevent gallstones from forming during the first 6 months post-op of rapid weight loss.      BEFORE Surgery Medication Considerations:  Certain medications may need to be stopped before major surgery. Some medications may increase your risk of bleeding, others may change the way your blood clots, and other medications can affect your lab  work. The bariatric clinic will give you specific instructions about when to stop these medications.    This timeline shows when certain medications should be stopped before surgery: This is a general timeline, if your bariatric nurse or surgeon gives you other instructions, follow those specific instructions.    30 Days (One Month) Before Surgery  ? Birth control pills & patches that contain estrogen  ? You must use alternative forms of contraception  ? Hormone replacement therapy   o Premarin  o Testosterone  14 Days (Two Weeks) Before Surgery  ? Appetite control medications   o Phentermine  o Other: __________  ? Diuretics ( water pills )   ? Talk to your primary doctor.  You may need an alternative medication,  o Hydrochlorothiazide (HCTZ)  o Furosemide (Lasix )  o Bumetanide (Bumex )  o Spironolactone (Aldactone )  o Chlorthalidone  o Any blood pressure medication that is combined with a diuretic  ? Herbal supplements  o Fish oil or Omega 3   Homeopathic remedies  7 Days (One Week) Before Surgery  ? Anti-platelet medications and medications that help to prevent blood clots:  o Clopidogrel (Plavix ), Prasugrel (Effient ), Ticagrelor (Brilinta )  o Aspirin/Dipyridamole (Aggrenox ), Dipyridamole (Persantine )  o Cilostazol (Pletal )  ? All Non-Steroidal Anti-Inflammatory Drugs (NSAIDs):   o Non-prescription: Ibuprofen (Advil , Motrin , Nuprin ), Naproxen (Naprosyn , Aleve , Anaprox ),   o Prescription: Piroxicam (Feldene ), Sulindac (Clinoril ), Tolmentin (Tolectin ), Celecoxib (Celebrex ), Diclofenac (Voltaren ), Indomethacin (Indocin ), Nambumetone (Relafen ), Flurbiprofen (Ansaid ), Ketoprofen (Orudis ), Etodolac (Lodine ), Meloxicam (Mobic ), Oxaprozin (Daypro )  ? Aspirin (including low-dose (81mg) and chewable  baby aspirin )  ? Warfarin (Coumadin , Jantoven )  o When warfarin is restarted (usually 24-48 hrs after surgery), dosing and INR monitoring will be managed by the Bariatric Clinic for 4-6 weeks after  your surgery date.   The Day Before Surgery  ? Diabetes medications: Follow the instructions on the  Diabetes Management for the Bariatric Surgery Patient  form.  The Day of Surgery  ? Diabetes medications: Follow the instructions on the  Diabetes Management for the Bariatric Surgery Patient  form.      HOME MEDICATION RECOMMENDATIONS:  Below you will see your specific medication instructions.  Please share this information with your primary care so they can make adjustments to your medications if necessary.    Acetaminophen (Brand Name: Tylenol or MPAP)  You will likely be sent home on Tylenol to go with your other pain medications after surgery.  It is ok to cut/crush regular or extra strength tablets.  Make sure tablet is not long acting or extended release.  Do not take more than 3000mg in 24 hours.  If you decide to use liquid Tylenol at home, we recommend you use Adult strength because you will have to take less liquid to get the same effect but it can be harder to find in the stores and might have an easier time ordering it online prior to your surgery.  Dilute the medication 1:1 with water before taking the liquid version to prevent dumping or stomach upset.     Vitamin B Complex  Stop taking 2 weeks before surgery.  May use if ok with bariatric dietitian.     Vitamin D3   Ok to cut or crush tablet; if a softgel will likely need to swallow whole if small enough.  If capsule is too large, may need to take tablet form until able to swallow larger pills again.  Do not take the morning of surgery and don t restart again until instructed by the dietitian.  This must be 5000 units (125 mcg) daily after surgery.      Packing for the Hospital  When packing for the hospital, anticipate staying overnight. Make a checklist so that you don't forget things you really want to have with you.      Bring a folder with your printed patient handouts to keep handy and add discharge paperwork to if you want.    Bring your  "insurance card.    Bring a current medication list OR update list in [employer] (including vitamins, over-the-counter medication, eye drops, inhalers, patches, ointments and herbal products). Include the name, correct dose and the times you take them each day. List all allergies.    If you use a CPAP or BIPAP, bring it with you.  You may also want to bring a water bottle of the water you use in your machine.    Bring a copy of your health care or advanced directive document if you have one.    You may bring your own gown/pajamas and robe if you don't want to wear the hospital-supplied items once IV is disconnected.    Slippers or shoes should have a non-slip sole.    You may bring your own personal care items such as toothbrush, toothpaste, shampoo, denture cleanser, comb, skin care products, deodorant, make-up, and hair dryer.    If you wear a hearing aid, bring a labeled storage container and extra batteries.    If you wear glasses or contacts, bring a labeled case and saline for contacts. Do not plan to wear contact lenses the day of surgery. It is best to bring your glasses so that you can wear them in the recovery room. You cannot wear contact lenses during surgery.    Bring loose-fitting clothing to wear home. Bring telephone numbers (including work numbers) of family and friends.    You may want to bring a journal to document your thoughts and feelings.    You may not wear any jewelry on or in any area of your body to the operating room.     Leave money, jewelry, or any valuables at home.        DAY BEFORE SURGERY CHECKLIST      1. Clear liquid diet until 4 hours prior to your surgery     2.  Nothing to eat or drink 4 hours prior to your surgery time.       3.  Pack a couple preferred protein shakes to have available in the hospital -See approved list of liquids from dietitian      4.  Hibiclens or Exidine shower. Use a fresh, clean towel to dry off. See \"Showering Before Surgery handout\" below.       MORNING OF " "SURGERY CHECKLIST      1. Hibiclens or Exidine shower. Use a fresh, clean towel to dry off. See \"Showering Before Surgery handout\" below.     2.   Remove all jewelry and piercings.      3.   Take any medications you have been advised to take the morning of surgery with a sip of water. (See med list above)     4.   Arrive 2 hours prior to your scheduled surgery time.    Showering Before Surgery  [email or web address]     HOSPITAL STAY    Park in the [facility] Visitor Parking Lot in [place] and check in at the information desk where they will escort you to the [facility].     You will be allowed to have 2 support people with you in the pre-operative area at the hospital.  After surgery on the recovery unit, you can have up to 4 visitors and they must leave when visitor hours are over.     Meet your surgical team which includes an RN, CRNA, anesthesiologist and your surgeon.    IV will be started for fluid management, pain medication and possible antibiotics.    Anticoagulant therapy (blood thinner) will be given and continued until you are discharged.    Pneumatic compression stockings will be placed on your legs before surgery to help prevent blood clots. You can also move your feet up and down frequently to aid in circulation.    An incentive spirometer will be used to promote good lung expansion and prevent pneumonia. This must be within arm's reach of you, and you should be doing it ten times every hour while awake.      PAIN MEDICATION IN THE HOSPITAL  Treating pain and discomfort is important to your recovery.  Your surgeon will order pain medication for you in the hospital.  You will also get a take home prescription for pain medication after surgery.  Our goal is not pain free, but an acceptable level of discomfort; you should be able to move without pain limiting your activity    In the Preoperative area, you will receive additional pain treatment   Intrathecal Spinae Block (Duramorph)- which " is an ultrasound guided injection for pain medication   Ketorolac (Toradol )  This is an anti-inflammatory medication used to treat swelling and moderate pain  It is used only for 24 hours after surgery to decrease inflammation and pain  Given through your IV  Takes about 30 minutes to take effect  Common side effects: nausea, upset stomach  If your pain is not well controlled with this, you may be given a narcotic pain medication in addition to ketorolac    In Recovery, you will receive additional IV pain medication as needed and then you will be switched over to things you can take by mouth in preparation for going home.   Fentanyl   Hydromorphone (Dilaudid )    Common side effects: sleepiness, dizziness, upset stomach, constipation   Narcotic medications can alter your judgment, coordination and reaction time.  Do NOT drive or do anything that requires clear thinking and coordination until you have been completely off narcotic medications for at least 24 hours.    Use only to treat moderate to severe pain.  Ultram (Tramadol)  This is a non-narcotic prescription pain medication used for moderate to severe pain if needed.  Immediate release tablets can be cut or crushed.  Tramadol can cause or worsen seizures. Your risk of seizures is higher if you're taking other certain medications. These drugs include other opioid pain drugs or certain medications for depression, other mood disorders, or psychosis.  Tramadol oral tablet may cause drowsiness. You should not drive, use heavy machinery, or perform any dangerous activities until you know how this drug affects you.  Acetaminophen (Tylenol)   This will be giving by IV to start and then by mouth once you are ready  Use for mild pain or discomfort  Available without a prescription and comes in liquid, tablets, capsules and powder.  The adult strength powder packs can be helpful right after surgery and can be found online.  Maximum daily dosage: 3,000mg per 24  "hours  Works best as a scheduled dose for the first three days once you get home in addition to the gabapentin as the inflammation goes down  Gabapentin  Used together with the acetaminophen to provide pain relief.  Can be taken every 8 hours as needed  The medication works by calming the nerve cells that transmit pain signals to the brain.  This is best in liquid form right after surgery but can have a unpleasant taste.    * Important Reminder: Do NOT take NSAID or aspirin medications that are available without a prescription (examples: Ibuprofen, Motrin , Advil , Aleve , Naproxen)      OPERATING ROOM    Hover Mat will be used to move you from one surface to another.    Gunnar Hugger Gowns/Blankets are used to keep you warm.    Urinary catheters are not usually needed.    Surgery takes 45 minutes-3 hours depending on the procedure.    *Remember: How you go to sleep tends to be how you wake up - so pleasant thoughts are important!    RECOVERY ROOM    The surgeon will give an update to your family or support people as you are on your way to the recovery room.    You will wake up with a blood pressure cuff, oxygen and pulse monitor (pulse oximeter).    Frequent vital signs.    Pain Scale.      HOSPITAL ROOM    You will stay overnight on Patient Care Unit P2.    You will have a private room.    [facility] has been awarded an \"American College of Surgeons Center of Excellence\" partnered with the American Society for Metabolic and Bariatric Surgery and has a proven track records for quality care and good outcomes.     The staff has specialty training in the care of weight-loss surgery patients.       WHAT TO EXPECT AFTER SURGERY    You will start walking the day of surgery and continue several times a day, especially once you get home.    Drinking and eating will follow plan discussed with the dietitian.    Shower in the hospital.    Incentive Spirometer use and deep breathing/cough    Splinting your incision and wearing " the abdominal binder when moving may help with discomfort.    Discharge the day after surgery is expected for laparoscopic procedures after you are seen by your surgeon, nurse practitioner or physician s assistant, anesthesia, and possibly a pharmacist.    A nurse from the clinic will call you within 3 days after discharge.      ONCE YOU ARE HOME CHECKLIST      1. Continue your liquid intake daily and track     Oral intake:      12-4pm         4pm-8pm         8pm-Midnight         6am-10am       2.   Walks      12-4pm      4pm-8pm      8pm-Midnight      6am-10am       3.   Incentive Spirometer/ Deep Breaths and Coughing      ACTIVITY AFTER SURGERY    Walking several times a day, increasing endurance and energy level over time.    No lifting over 20 lbs. for the first 2 weeks (6 weeks for open procedure) and then let your body be your guide.    Can be the    in terms of chores at home.    Pushing and pulling uses the same core muscles and those activities may cause pain or discomfort.    Do each exercise 10-15 times, twice a day and increase weight when you can consistently do 12 repetitions of activity.    No swimming, bathing, or hot tub use until incisions are completely healed (scars).    Do not plan to fly or take a road trip within 1 to 3 months after surgery.    See handouts below for exercises that can be done after surgery.    Seated Exercises for Arms and Legs (can be done before or after surgery)  [email or web address]    Exercise Guidelines after Weight Loss Surgery (1st 4-6 weeks)  [email or web address]    Exercises after Weight Loss Surgery (strengthening, when no weight lifting restrictions - after 4-6 weeks)  [email or web address]      MEDICATIONS AFTER SURGERY    Here is a simple graph that might help makes things more clear for when to start certain medications after surgery.  (Zofran, Tylenol, Gabapentin and Hyoscyamine will be sent home with you  from the hospital as needed)    Medication Dose When to Start   Vitamin B12  1000 mcg Once a day under the tongue (sublingual), weekly nasal spray, or monthly injections Day after surgery   Chewable Multivitamin with   18 mg Iron  (Must also contain Vitamin A and Zinc)  NO GUMMIES 1 tablet twice daily Day after surgery   Omeprazole  20 mg 1 capsule daily - open and sprinkle on food or swallow with fluid Day after surgery. Take even if no acid reflux symptoms.   Zofran 4 mg  (if ordered) 4mg tablet every 8 hours as needed for nausea As needed after surgery   Tylenol 1000 mg every 6 hours for three days after surgery.  After first three days after surgery, switch to as needed and do not take more than 3000mg daily Once you get home   (you will be sent home from the hospital with this)   Hyoscyamine  (if ordered) 0.125 mg tablet every 4 hours as needed for stomach spasm pain As needed after surgery   Liquid Gabapentin  Must be kept in fridge   250 mg liquid 3 times a day for at least 3 days after surgery Once you get home   (you will be sent home from the hospital with this)   Actigall (Ursodiol)- 300 mg for gallbladder if you still have Twice daily - swallow whole with warm water Two weeks after surgery   Chewable Calcium Citrate 2 tablets twice daily Three weeks after surgery   Vitamin D - (125 mcg)  5000 units 1 daily Three weeks after surgery        LIFELONG VITAMINS:    First 3 Months after Surgery  Choose chewable, liquid or crushable forms of Multivitamin and Calcium Citrate and then you can switch to tablets you can swallow whole if you would like.    1.)   Sublingual Vitamin B12: Take 0411-2485 mcg 1 time daily    Also available in injectable form monthly.  Discuss with provider if interested.  2.)   Multivitamin with Iron: Take 1 multivitamin 2 times daily    Needs 18 mg of IRON per dose along with Vitamin A and Zinc in them  o Generic Children's Chewable multivitamin with iron (Equate, Up & Up brand,  etc.)  o Centrum   Chewable  o Centrum, Women's One-A-Day or Generic (after 3 months)    NO GUMMY Vitamins (these do not contain iron)  3.)   Calcium Citrate with Vitamin D: Take 400-600 mg 2 times daily (Start 3 weeks after surgery)    Bariatric Advantage: Citrate Lozenges (500mg)--2 Daily, or Chewy Bites (250 mg)--4 Daily  o www.BuscapÃ©  or 1-964.351.9870    Celebrate Calcium: Calcium Plus 500 (500mg)--2 Daily, or Calcet Creamy Bites (500 mg)--2 Daily, or Calcium Citrate Chews (250mg) - 4 Daily   o www.celebratevitaminsVitals (vitals.com)  or 1-543.216.3667    UNJURY Opurity: Calcium Citrate Plus (300mg)--3 Daily  o 5app  or 1-158.629.1467    Up-Elmo D: Nutrition Direct: Flavorless Calcium Powder (500 mg with 250 IU Vitamin D)  o www.amazon.com  or 1-109.155.5873--3 Scoops Daily    Wellesse Liquid Calcium Citrate--1 Tbsp.  (500 mg)--2 Times Daily  o [employer]    After 3 months post op:  Citracal Petites (or Generic version) (200mg) - 4 daily  o Any grocery store or Pharmacy  4.)   Vitamin D3 : Take 5000 IU Daily (Start 3 weeks after surgery)    This can remain a small gel cap    AFTER Surgery Medication Considerations:    The 3 main ideas:  1. Cut or crush all meds for 6 weeks after surgery  2. Long acting medications are not the best option anymore  3. Avoid NSAID medications for the rest of your life    1.  Cutting or crushing meds:  Before surgery, tablet size does not matter.  After surgery there will be swelling around your new stomach pouch or sleeve.  Therefore, it is important to limit the size of medications for the first six weeks after surgery.      What this means for you:    For the first six weeks after surgery, you will need to cut or crush tablets that are larger than   inch (about the size of an eraser on a standard pencil)    Some capsules may be opened and the contents sprinkled onto soft food.  Once sprinkled on food, eat immediately being careful not to chew.      You will  be given a pill cutter at the hospital    Refer to your medication list. This list will tell you which medications can be cut or crushed, and which capsules can be opened.     * Important Reminder: Discuss ALL of your medications with your primary care provider.    Your pre-op history and physical appointment is a good time to review your current medications.     2.  Long acting medications are not the best option anymore  Many types of bariatric surgery involve removing a portion of the small intestine. Long acting or extended release medications release slowly throughout the entire small intestine. Because your surgery has changed your stomach and/or intestine, you may not get the full effect of the long acting medication.  Short acting medications may work better for you after surgery. Talk with your doctor at your pre-op history and physical appointment if you are taking long acting medications. Your doctor can change prescriptions for long acting medications to short acting.   3.  Avoid NSAIDs for the rest of your life  NSAIDs are Non-Steroidal Anti-Inflammatory Drugs  The NSAID class of medications is used to relieve minor aches, pains or to treat fever. They are commonly used to treat pain caused by a cold, flu, sore throat, headache, and arthritis.  Some NSAIDs are available over-the-counter while others need a prescription from your doctor.     NSAIDs should be avoided after bariatric surgery because they can cause stomach ulcers, scarring or bleeding.  You will not be able to take any NSAID mediation for the rest of your life after bariatric surgery.  Below is a list of common NSAID medications:    OVER-THE-COUNTER NSAIDs    Ibuprofen  - Brand names Advil   , Motrin  , Nuprin     Naproxen - Brand names Aleve  , Naprosyn  , Anaprox      PRESCRIPTION NSAIDs   Celebrex   (Celecoxib)                                       Orudis   (Ketoprofen)  Mobic   (Meloxicam)                                            Lodine   (Etodolac)  Voltaren  (Diclofenac)                                        Ansaid   (Flurbiprofen)  Relafen   Nabumetone                                       Clinoril   (Sulindac)  Feldene   (Piroxicam)                                         Tolectin   Tolmentin  Indocin   (Indomethacin)                                    Daypro  (Oxaprozin)       After surgery, the only safe non-prescription pain reliever is acetaminophen (Tylenol ).  Acetaminophen is available in 325mg and 500mg tablets.  If acetaminophen does not control your pain, call your primary care provider to discuss other options.    4. Other medications you may have to avoid  Aspirin  Do not use aspirin for headaches, body aches, or muscle aches after bariatric surgery.  This is because aspirin can also cause stomach ulcers.  However, your primary care provider may tell you to take aspirin for certain conditions.  A maximum of 81mg of enteric coated aspirin (ex: Ecotrin ) once daily is usually okay to take if directed to take aspirin by your primary care doctor.   Be sure to take with food or milk.    Alendronate (Fosamax ), risedronate (Actonel ) (risedronate), ibandronate (Boniva ):  These are common osteoporosis medications that can cause erosions or ulcers in the esophagus and stomach.   Remind your primary care provider that you have had bariatric surgery and discuss other medication options.    Diuretics ( water pills )  These medications are used to treat high blood pressure.  They can also reduce swelling and water retention caused by various medical conditions.  Diuretics should not be used for patients who are having weight loss surgery.  Your diuretic will not be restarted immediately after surgery.  This is because it is often difficult to drink enough fluids after surgery to keep up with the fluid loss caused by the diuretic/ water pill .  Call your primary care provider to discuss alternative medications to treat high blood  pressure.      5. Medications for chronic conditions  It is likely that the need for some of your medications will change after weight loss surgery.    High Blood Pressure Medications   As you lose weight, your blood pressure may change. Talk with your primary care provider about how to manage your high blood pressure after surgery. You may need the dose of your blood pressure medication(s) adjusted.    Keep track of your blood pressure, and call your primary doctor if you have low blood pressure symptoms, such as: dizziness, faintness, weakness, light-headedness (especially when going from sitting to standing)    Diabetes Medications  As you lose weight, your blood sugars may change. Talk with your primary care provider about how to monitor and manage your diabetes after surgery. You may need the dose of your diabetes medication(s) adjusted.  Call your primary doctor if you have any of these symptoms.    Keep track of your blood sugars, and call your primary doctor if you have low blood sugar symptoms, such as: sweating, shaking, nervousness, headache, light-headedness, dizziness, weakness, or fainting      6. Other information    Medication Absorption  Some medications may not be absorbed as well after bariatric surgery.  Watch for changes in symptoms.  It is important to discuss your medications with your doctor if you think your medications are not working as well as they were before.  You may need to have medication doses adjusted.    Ursodiol (Actigall )  With rapid weight loss, your risk of gallstones increases.  If your still have your gallbladder after surgery, you will be given a prescription for Actigall  that you should begin taking 2 weeks after surgery.  Actigall  will help prevent gallstones from forming.  If you are prescribed this medication, you will usually take it for six months. We encourage you to take this tablet or capsule whole with warm or hot liquid to help it dissolve before it reaches  your stomach pouch.  This is medication is bigger than the   inch size restriction, but we will not have you cut/crush it due to it's unpleasant metallic taste. A pharmacist will speak with you more about this medication if it is ordered after surgery.    Common Side Effects: constipation, diarrhea, upset stomach, nausea, dizziness    Non-prescription medications:  You may need medication for seasonal allergies, colds, headaches, or minor aches and pains.  It is important to read the package label carefully.  Below are some helpful hints for choosing the right medication:     Look at the active ingredient(s) list to be sure the medication does not contain caffeine, NSAIDs or aspirin (maximum aspirin dose: 81 mg daily).    Avoid long-acting medication options.  Immediate release medications may work better because they are absorbed better.    It is okay to use liquid medications with the following cautions:  o Watch the sugar concentration.  High sugar content in medications could cause dumping syndrome.  Diluting the liquid medication with an equal amount of water will help prevent dumping syndrome.  o Do not use products that contain high fructose corn syrup, corn syrup, sugar, or sorbitol.  Look for sugar-free products as an alternative.    Chewable tablets or tablets that dissolve on your tongue ( oral-dissolving tablet ) are generally safe to use.      Supplements  Refer to the Vitamins & Supplements Handout provided to you by the Bariatric Dietitian for recommended doses and products.       After Bariatric Surgery Discharge Instructions  [facility]     Note: Ask your nurse to order your medications from the pharmacy. Be sure you have your medications with you when you leave.    What should my diet be for the first 2 weeks after surgery?  Follow the bariatric diet the dietitian discussed with you.    How much fluid should I drink?  Strive for 48-64 ounces daily.  Carry a  water bottle with you without a straw or sports top. Drink from it often.  Keep track of how much fluid you drink in a day.  Remember:  -Do not use straws, chew gum or suck on hard candies. They may cause painful gas.    -Sip, don't slurp when you drink.    -Practice small sips using a medicine cup for the first week postop.   -No ice or cold drinks. This could cause gas or spasms.   -No coffee, soda pop or drinks with caffeine. These may cause stomach pain.   -No alcohol. It is bad for your liver and will cause stomach pain.    How often should I do my deep breathing and coughing?  Use your incentive spirometer (small plastic breathing device) every hour while awake after you get home. Using the incentive spirometer helps you deep breath. Continuing to cough and deep breath will help prevent fevers and pneumonia.   If you do not have a fever after one week, you can stop using the incentive spirometer and discard it.     You can continue to take deep breaths without the incentive spirometer every one to two hours while awake for the month after surgery.    What kinds of activity can I do?  Get plenty of rest the first few days after surgery and try to balance rest and activity. You will need some time to recover - you may be more tired than you realize at first. You'll feel better and heal faster if you take good care of yourself.  For 2 weeks after surgery (Please review restrictions at your two week visit, they could change based on how well you are doing):   -Don't lift more than 20 pounds.   -Take 4-5 short walks every day.  -Don't jog, run, or do belly exercises.  Don't swim, bathe or use a hot tub until your cuts are healed (scabs are gone).    You may shower right away after surgery.  Don't plan to fly or take a road trip within the first 1 to 3 months after surgery.  You could get a blood clot in your legs. If you must travel, get up and move around every hour for at least 5 minutes before continuing on your  journey.  Your care team can help you decide when it's safe for you to travel.     What can I do for pain control?  You had major belly surgery that involves all layers of your belly muscles. Pain is expected, even for some as far out as 6-8 weeks after surgery. Moving, sneezing, coughing, and breathing will cause discomfort because these activities use your belly muscles.   Please see your after visit summary medication review for what pain medication will be continued, discontinued and newly started for you.    You can take opioid pain medicine if prescribed and if needed. Try to wean off from it as soon as you feel comfortable.   Do not drive while you are taking opioid pain medicine. This is dangerous.  The first three days after surgery, take your acetaminophen (Tylenol) scheduled every 6 hours.  After that you can take it as needed.  -Acetaminophen formulation options:   -Liquid   -Caplet (Cut caplet in half before taking)   -We will have you on a higher dose of Tylenol for the first three days post-op but then but then you should not exceed 3000mg per day.  -You will also take Tylenol for pain in place of the opioid pain medicine (check with your care team for specifics).   You can apply ice or heat to the affected area(s). Just remember to wrap the ice in something and limit icing sessions to 20 minutes. Excessive icing can irritate the skin or cause skin damage.  You can apply heat with a hot, wet towel or heating pad. Just like cold therapy, limit heat application to 20 minutes. Never sleep with a heating pad on. It could cause severe burns to your skin.  Wear your binder to support your belly muscles if you have one.  Take this off a little more each day and try to be off completely by 2 weeks after surgery. If you don't need your binder for comfort or support, you don't need to wear it.   You may not be able to sleep in a comfortable position for a few weeks after surgery. This is normal. You may be more  comfortable sleeping in a recliner or propped up with 3 pillows for the first couple of weeks after surgery.  You may feel gas pains in the upper chest or between your shoulder blades from the laparoscopic surgery which should get better with walking around, warm/cold packs and pain medication.  Do not take NSAID's (Non-Steroidal Anti-Inflammatory Drugs) (examples: ibuprofen, Motrin, Advil, Aleve, and Naproxen), aspirin, or use pain patches with NSAID's. They will increase your risk of bleeding or getting an ulcer.    Please call the clinic for any of the following pain concerns, we would like to talk to you:  -pain that does not improve with rest  -pain that gets worse and worse  -pain that is not controlled by your pain medicine  -a sudden severe increase in pain    What medications will I need to take after surgery?  You may be discharged with the following types of medications: antacids, pain medications, anti-nausea medications, etc.  Please see your after visit summary medication review for what will be continued, discontinued and newly started.  It is important to reduce the amount of acid in your new stomach for a couple of months after surgery while it is still healing. We will prescribe an acid reducer or antacid. Take it as directed. This will help prevent ulcers, heartburn and acid reflux.  If you took an acid reducer before you had surgery, your care team will let you know what acid reducer you will take after surgery.   It is okay to swallow any medications smaller than   inch in size.   -If it is larger than   inch, it may need to be cut, crushed, or in a liquid form. See the above medication section for what is most appropriate for you and your medications.    What should I know about my incisions (cuts)?  Your incisions are covered with white steri strips or butterfly tape and have band aids or gauze over the top. If you have gauze or band aids, they can come off in the hospital.  Leave your steri  strips on until they fall off on their own. If the steri strips don't fall off after 1 week, you can take them off. If they fall off earlier, replace them with clean band aids trying to avoid touching the incision itself.   If you have gauze covered by a clear dressing, remove 2-3 days after surgery or as directed by your care team.  You may shower in the hospital after surgery and can get your incision coverings wet.  Do not submerge in water (e.g. No baths, swimming pools, hot tubs) until your incisions are completely healed (scabs are gone).    Call the clinic if you have any of these signs or symptoms of infection:  -Redness around the site.  -Drainage that smells bad.  -Drainage that is thick yellow or green.  -An increase in pain around the incision site  -An increase in swelling around the incision site  -Heat or warmth around incision site   -Fever of 101.5  F (38.3  C) or higher when taken under the tongue.    -Chills    Will my urine or bowel movements change?   Your first bowel movements (stools) will likely be liquid. You may also notice old blood or a darker color (black or maroon color) in your bowel movements.  This is not unusual and usually goes away after the first week, if not sooner. You may not have a bowel movement for a week.   If you have not had a bowel movement for at least three days after your surgery date and are passing gas, you can use over the counter stool softeners.  Please stop taking the stool softeners and laxatives if your stools are loose.  Increasing fluids and activity as well as getting off narcotics will help prevent constipation.    Call the clinic if:   -You have stomach pain.   -You continue to have constipation.  -You have excessive bloating after walking and passing gas.  -You are having 3 or more loose stools a day.  You may have an infection that we need to treat.    How can I prevent dehydration if I feel nauseated (sick to my stomach) and vomit (throw up)?  "  Vomiting is not normal after surgery. If you continue to have nausea and vomiting, call the clinic.   Nausea can be a sign of dehydration. That is why it is very important to track your fluids. Do not nap more than one hour during the day. Set a timer to wake yourself up, if needed. Too much sleep will keep you from drinking enough fluid during the day and lead to dehydration.  No outside activity in hot, humid weather until you can drink 48 to 64 ounces of fluid in 24 hours. If you sweat a lot, your body may lose too much water.  If having difficulty getting in your fluids, try to take a 1 ounce sip of water (one medicine cup) every 15 minutes.  Set a timer to remind yourself.    If you notice any of the following symptoms, please don't delay in calling the clinic.  Early identification gives us more options for treatment:  -Dark colored urine  -Urinating (pass water) less than 2-3 times per day  -Lack of energy  -Nausea  -Dizziness  -Headache  -Metallic taste in your mouth    Call the clinic ANY TIME at [phone] if:  -Your pain medicine is not working.  -You have a fever ? 101.5 F.  -You have belly or left shoulder pain that gets worse and worse.  -You have a swollen leg with redness, warmth, or pain behind the knee or calf.  -You have chest pain   -You feel very short of breath.  -You have a sudden severe increase in heart rate.  -You have vomiting that gets worse and worse.  -You have constant nausea (feeling sick to your stomach) that does not go away with medication.  -You have trouble swallowing.  -You have an increasing feeling that \"something is not right\".  -You have hiccups that do not stop.  -You have any questions or concerns.    If you have to go to the Emergency Room, we prefer you go to the hospital that did your surgery. Please let them know that you had bariatric surgery and to notify your surgeon.    When should I go back to the clinic?  Follow up with your care team in 1 week.   If this " appointment was not already made, please call: [phone]    These are some additional handouts that are very important to read through and use after surgery.  They are good tools for after your surgery as you recover.      After Your Weight Loss Surgery  [email or web address]      Mindfulness Meditation  [email or web address]    Conscious Breathing  [email or web address]    Keeping Track of Your Fluids (for after surgery)  [email or web address]      AFTER SURGERY APPOINTMENT SCHEDULE    1 week with Dietitian (RD)  Dietitian Virtual Group Class scheduled for Tuesday 4/25/2023 from 1-2 pm with one of the dietitians. She will send you an email invitation to join the week of this class and the purpose of it is to check in with you and give you the next set of dietary instructions.  It will be using [employer] Teams, NOT in person or through [employer].    2 weeks with Surgeon  Surgeon video follow-up visit that will be done through [employer] which is already scheduled for you.    1 month with RD    3 months with RD    6 months with Bariatrician with labs    9 months with RD     12 months with Bariatrician with labs    18 months with RD or Bariatrician-possible labs    Annually after that with Bariatrician with labs and RD if needed      POP QUIZ    1.  How long do you need to be on full liquids after surgery? ________    2.  Name the 4 vitamin/mineral supplements you ll need to take for the rest of your life.    __________  _________  _________  __________    3.  How many grams of protein should you get in a day? ________    4.   Which meal would give you the most protein?    a.   1 oz. Cheese on 1 saltine cracker and 3 Tbsp applesauce.    b.     cup mashed potatoes and gravy with 2 Tablespoons applesauce    c.   3 Reduced Fat Wheat Thins, 1 oz. green beans, and 2 peeled grapes     5.   Concentrated urine, lack of energy, nausea, dizziness, headache, and a bad taste in  your mouth are signs of:    a. Dumping    b.  Dehydration   c. Clogging    d. None of the above    6.     What is the minimum amount of time recommended for exercise?    a.  30 minutes 7 days a week    b.  30 minutes 3 days per week    c.   1 hour 5 days per week    d.  None of the above    7.     Drinking liquids with meals can cause:    a.  Vomiting     b.   Weight gain   c.   Desire to Snack    d.   All of the above    8.     The long-term success of my weight loss surgery depends on:    a.      Me      b.   My Surgeon     c.  My Support Group     d.  My Family    9.      If you receive ice, carbonation or straws in the hospital post-op, you should:    a.      Eat and drink whatever is given to you by the hospital staff    b.      Remind the hospital staff you are not to have ice, straws or carbonation.    c.      Take the ice but not the carbonation or straws    d.      Call 911    10.  Name two possible complications after bariatric surgery:    ________________________   ______________________    11. Name two habits of healthy bariatric surgery patients:    _______________________  ________________________    True or False    12.  This operation for obesity will require routine visits with my surgeon for the first year, and then I will be okay on my own once I lose my weight and change my diet.    13.  Some individuals fail to lose weight or regain their weight because they resume snacking, binging, take in high fat or carbohydrate food & lack sufficient liquids and exercise.    14.  Women should avoid becoming pregnant for at least 18 months to 2 years following bariatric surgery.    15.  I can still drink 2-3 cans of soda or carbonated juices, water or other beverages after my surgery, as long as it is in moderation.    16.   Re-operation is sometimes necessary due to bleeding, hernias, ulceration, breakdown of stitches or staples, leakage and blockage of the intestines.    17.   I should call the clinic if I  develop increased redness or swelling in or around my incision, an oral temperature of 101.5 or greater, increasing severe abdominal or shoulder pain, increasing heart rate or anytime I just don t feel right.    18.   Gaining weight prior to surgery is dangerous because it can enlarge the liver, increase surgical risk and extend your recovery period.    19.   Once I lose my weight, I can drink alcohol as long as it is in moderation.    20.  It is possible I will have more emotional difficulties after surgery.    21.  I can take Aleve but not Ibuprofen or Aspirin after surgery.    22.  If I eat yogurt and drink milk daily, I don t have to take the recommended dose of calcium supplements    23.  Dumping Syndrome only occurs in gastric sleeve patients.    24.  It is possible you will gain weight or not lose much weight immediately after surgery.       We wish you the best and please let us know if you have any questions or concerns!    Lety Brewer RN and Sharee Vazquez RN    [facility]  [address], [address]  Phone: [phone]  Fax:  [phone]

## 2023-03-29 ENCOUNTER — ALLIED HEALTH/NURSE VISIT (OUTPATIENT)
Dept: SURGERY | Facility: CLINIC | Age: 23
End: 2023-03-29
Payer: COMMERCIAL

## 2023-03-29 VITALS — WEIGHT: 238 LBS | BODY MASS INDEX: 40.63 KG/M2 | HEIGHT: 64 IN

## 2023-03-29 DIAGNOSIS — K90.9 INTESTINAL MALABSORPTION, UNSPECIFIED TYPE: ICD-10-CM

## 2023-03-29 DIAGNOSIS — R63.4 RAPID WEIGHT LOSS: ICD-10-CM

## 2023-03-29 DIAGNOSIS — K21.9 ACID REFLUX: ICD-10-CM

## 2023-03-29 DIAGNOSIS — Z98.84 S/P BARIATRIC SURGERY: ICD-10-CM

## 2023-03-29 DIAGNOSIS — Z01.818 PRE-OP TESTING: ICD-10-CM

## 2023-03-29 DIAGNOSIS — E66.01 MORBID OBESITY (H): ICD-10-CM

## 2023-03-29 DIAGNOSIS — Z71.89 ENCOUNTER FOR PRE-BARIATRIC SURGERY COUNSELING AND EDUCATION: Primary | ICD-10-CM

## 2023-03-29 PROCEDURE — 99207 PR PREOP VISIT IN GLOBAL PKG: CPT

## 2023-03-29 RX ORDER — MULTIVIT-MIN/FOLIC/VIT K/LYCOP 400-300MCG
1 TABLET ORAL 2 TIMES DAILY
Qty: 180 TABLET | Refills: 3 | Status: SHIPPED | OUTPATIENT
Start: 2023-04-19 | End: 2023-04-17

## 2023-03-29 RX ORDER — URSODIOL 300 MG/1
300 CAPSULE ORAL 2 TIMES DAILY
Qty: 180 CAPSULE | Refills: 1 | Status: SHIPPED | OUTPATIENT
Start: 2023-05-02 | End: 2023-10-29

## 2023-03-29 NOTE — PROGRESS NOTES
Pt attended the pre-surgery class for bariatric surgery class and was educated on the pre and post surgery liquid diets. Patient received information via [employer] for the pre-op liquid diet and the post-op liquid diet. Discussed appropriate liquids and discussed portions. Reviewed appropriate calories, protein, and fluid goals for each stage before and after surgery. Educated on correct vitamins/minerals, dosage, and frequency to take after surgery. Provided grocery list and sample menu plan for each diet stage.      Pt will begin pre-op liquid diet 4/4/23 and will do clear liquids the day before surgery. Pt is scheduled for LSG on 4/18/23 and will then follow 1 week post-op liquid diet. Pt will follow up with RD 1-week post-op for education on the pureed and soft/regular diet stages.    Luna Dickson RD

## 2023-03-30 ENCOUNTER — TRANSFERRED RECORDS (OUTPATIENT)
Dept: MULTI SPECIALTY CLINIC | Facility: CLINIC | Age: 23
End: 2023-03-30

## 2023-03-30 ENCOUNTER — TRANSFERRED RECORDS (OUTPATIENT)
Dept: HEALTH INFORMATION MANAGEMENT | Facility: CLINIC | Age: 23
End: 2023-03-30
Payer: COMMERCIAL

## 2023-04-17 ENCOUNTER — ANESTHESIA EVENT (OUTPATIENT)
Dept: SURGERY | Facility: HOSPITAL | Age: 23
End: 2023-04-17
Payer: COMMERCIAL

## 2023-04-17 RX ORDER — SUMATRIPTAN 25 MG/1
25 TABLET, FILM COATED ORAL
COMMUNITY
End: 2023-04-17

## 2023-04-17 RX ORDER — ESCITALOPRAM OXALATE 5 MG/1
5 TABLET ORAL AT BEDTIME
COMMUNITY
End: 2023-04-17

## 2023-04-17 RX ORDER — FERROUS GLUCONATE 324(37.5)
1 TABLET ORAL DAILY
COMMUNITY
End: 2023-04-17

## 2023-04-17 RX ORDER — HYDROXYZINE HYDROCHLORIDE 25 MG/1
25 TABLET, FILM COATED ORAL 3 TIMES DAILY PRN
COMMUNITY
End: 2023-04-17

## 2023-04-18 ENCOUNTER — HOSPITAL ENCOUNTER (INPATIENT)
Facility: HOSPITAL | Age: 23
LOS: 1 days | Discharge: HOME | End: 2023-04-19
Attending: SURGERY | Admitting: SURGERY
Payer: COMMERCIAL

## 2023-04-18 ENCOUNTER — ANESTHESIA (OUTPATIENT)
Dept: SURGERY | Facility: HOSPITAL | Age: 23
End: 2023-04-18
Payer: COMMERCIAL

## 2023-04-18 DIAGNOSIS — R79.89 LOW SERUM VITAMIN D: ICD-10-CM

## 2023-04-18 DIAGNOSIS — E66.01 MORBID (SEVERE) OBESITY DUE TO EXCESS CALORIES (H): Primary | ICD-10-CM

## 2023-04-18 DIAGNOSIS — Z90.3 S/P GASTRIC SLEEVE PROCEDURE: ICD-10-CM

## 2023-04-18 DIAGNOSIS — E53.8 LOW SERUM VITAMIN B12: ICD-10-CM

## 2023-04-18 LAB
CREAT SERPL-MCNC: 0.71 MG/DL (ref 0.51–0.95)
GFR SERPL CREATININE-BSD FRML MDRD: >90 ML/MIN/1.73M2
GLUCOSE BLDC GLUCOMTR-MCNC: 81 MG/DL (ref 70–99)
PLATELET # BLD AUTO: 200 10E3/UL (ref 150–450)

## 2023-04-18 PROCEDURE — 250N000011 HC RX IP 250 OP 636: Performed by: SURGERY

## 2023-04-18 PROCEDURE — 88305 TISSUE EXAM BY PATHOLOGIST: CPT | Mod: TC | Performed by: SURGERY

## 2023-04-18 PROCEDURE — 258N000003 HC RX IP 258 OP 636: Performed by: NURSE PRACTITIONER

## 2023-04-18 PROCEDURE — 250N000011 HC RX IP 250 OP 636: Performed by: NURSE PRACTITIONER

## 2023-04-18 PROCEDURE — 43775 LAP SLEEVE GASTRECTOMY: CPT | Mod: AS | Performed by: NURSE PRACTITIONER

## 2023-04-18 PROCEDURE — 250N000009 HC RX 250: Performed by: NURSE ANESTHETIST, CERTIFIED REGISTERED

## 2023-04-18 PROCEDURE — 0DB64Z3 EXCISION OF STOMACH, PERCUTANEOUS ENDOSCOPIC APPROACH, VERTICAL: ICD-10-PCS | Performed by: SURGERY

## 2023-04-18 PROCEDURE — 43775 LAP SLEEVE GASTRECTOMY: CPT | Performed by: SURGERY

## 2023-04-18 PROCEDURE — 250N000009 HC RX 250: Performed by: ANESTHESIOLOGY

## 2023-04-18 PROCEDURE — 272N000001 HC OR GENERAL SUPPLY STERILE: Performed by: SURGERY

## 2023-04-18 PROCEDURE — 88305 TISSUE EXAM BY PATHOLOGIST: CPT | Mod: 26 | Performed by: PATHOLOGY

## 2023-04-18 PROCEDURE — 250N000011 HC RX IP 250 OP 636: Performed by: NURSE ANESTHETIST, CERTIFIED REGISTERED

## 2023-04-18 PROCEDURE — 88342 IMHCHEM/IMCYTCHM 1ST ANTB: CPT | Mod: 26 | Performed by: PATHOLOGY

## 2023-04-18 PROCEDURE — 999N000141 HC STATISTIC PRE-PROCEDURE NURSING ASSESSMENT: Performed by: SURGERY

## 2023-04-18 PROCEDURE — 82565 ASSAY OF CREATININE: CPT | Performed by: NURSE PRACTITIONER

## 2023-04-18 PROCEDURE — 258N000003 HC RX IP 258 OP 636: Performed by: ANESTHESIOLOGY

## 2023-04-18 PROCEDURE — 250N000011 HC RX IP 250 OP 636: Performed by: ANESTHESIOLOGY

## 2023-04-18 PROCEDURE — 85049 AUTOMATED PLATELET COUNT: CPT | Performed by: SURGERY

## 2023-04-18 PROCEDURE — 370N000017 HC ANESTHESIA TECHNICAL FEE, PER MIN: Performed by: SURGERY

## 2023-04-18 PROCEDURE — 710N000009 HC RECOVERY PHASE 1, LEVEL 1, PER MIN: Performed by: SURGERY

## 2023-04-18 PROCEDURE — 360N000077 HC SURGERY LEVEL 4, PER MIN: Performed by: SURGERY

## 2023-04-18 PROCEDURE — 36415 COLL VENOUS BLD VENIPUNCTURE: CPT | Performed by: SURGERY

## 2023-04-18 PROCEDURE — 250N000025 HC SEVOFLURANE, PER MIN: Performed by: SURGERY

## 2023-04-18 PROCEDURE — 258N000003 HC RX IP 258 OP 636: Performed by: NURSE ANESTHETIST, CERTIFIED REGISTERED

## 2023-04-18 PROCEDURE — 250N000013 HC RX MED GY IP 250 OP 250 PS 637: Performed by: SURGERY

## 2023-04-18 PROCEDURE — 120N000001 HC R&B MED SURG/OB

## 2023-04-18 PROCEDURE — 250N000013 HC RX MED GY IP 250 OP 250 PS 637: Performed by: NURSE PRACTITIONER

## 2023-04-18 RX ORDER — GABAPENTIN 300 MG/1
600 CAPSULE ORAL
Status: COMPLETED | OUTPATIENT
Start: 2023-04-18 | End: 2023-04-18

## 2023-04-18 RX ORDER — ONDANSETRON 2 MG/ML
INJECTION INTRAMUSCULAR; INTRAVENOUS PRN
Status: DISCONTINUED | OUTPATIENT
Start: 2023-04-18 | End: 2023-04-18

## 2023-04-18 RX ORDER — ACETAMINOPHEN 325 MG/1
975 TABLET ORAL EVERY 6 HOURS
Status: DISCONTINUED | OUTPATIENT
Start: 2023-04-18 | End: 2023-04-19 | Stop reason: HOSPADM

## 2023-04-18 RX ORDER — ONDANSETRON 4 MG/1
4 TABLET, ORALLY DISINTEGRATING ORAL EVERY 30 MIN PRN
Status: DISCONTINUED | OUTPATIENT
Start: 2023-04-18 | End: 2023-04-18 | Stop reason: HOSPADM

## 2023-04-18 RX ORDER — NALBUPHINE HYDROCHLORIDE 20 MG/ML
5 INJECTION, SOLUTION INTRAMUSCULAR; INTRAVENOUS; SUBCUTANEOUS EVERY 4 HOURS PRN
Status: DISCONTINUED | OUTPATIENT
Start: 2023-04-18 | End: 2023-04-19 | Stop reason: HOSPADM

## 2023-04-18 RX ORDER — PROCHLORPERAZINE MALEATE 10 MG
10 TABLET ORAL EVERY 6 HOURS PRN
Status: DISCONTINUED | OUTPATIENT
Start: 2023-04-18 | End: 2023-04-19 | Stop reason: HOSPADM

## 2023-04-18 RX ORDER — FENTANYL CITRATE 50 UG/ML
25-100 INJECTION, SOLUTION INTRAMUSCULAR; INTRAVENOUS
Status: DISCONTINUED | OUTPATIENT
Start: 2023-04-18 | End: 2023-04-18 | Stop reason: HOSPADM

## 2023-04-18 RX ORDER — KETOROLAC TROMETHAMINE 30 MG/ML
30 INJECTION, SOLUTION INTRAMUSCULAR; INTRAVENOUS EVERY 6 HOURS
Status: DISCONTINUED | OUTPATIENT
Start: 2023-04-18 | End: 2023-04-19 | Stop reason: HOSPADM

## 2023-04-18 RX ORDER — NALOXONE HYDROCHLORIDE 0.4 MG/ML
0.4 INJECTION, SOLUTION INTRAMUSCULAR; INTRAVENOUS; SUBCUTANEOUS
Status: DISCONTINUED | OUTPATIENT
Start: 2023-04-18 | End: 2023-04-19 | Stop reason: HOSPADM

## 2023-04-18 RX ORDER — HEPARIN SODIUM 5000 [USP'U]/.5ML
5000 INJECTION, SOLUTION INTRAVENOUS; SUBCUTANEOUS ONCE
Status: COMPLETED | OUTPATIENT
Start: 2023-04-18 | End: 2023-04-18

## 2023-04-18 RX ORDER — ACETAMINOPHEN 10 MG/ML
1000 INJECTION, SOLUTION INTRAVENOUS EVERY 6 HOURS
Status: DISCONTINUED | OUTPATIENT
Start: 2023-04-18 | End: 2023-04-19 | Stop reason: HOSPADM

## 2023-04-18 RX ORDER — CEFAZOLIN SODIUM/WATER 2 G/20 ML
2 SYRINGE (ML) INTRAVENOUS SEE ADMIN INSTRUCTIONS
Status: DISCONTINUED | OUTPATIENT
Start: 2023-04-18 | End: 2023-04-18 | Stop reason: HOSPADM

## 2023-04-18 RX ORDER — NALOXONE HYDROCHLORIDE 1 MG/ML
0.4 INJECTION INTRAMUSCULAR; INTRAVENOUS; SUBCUTANEOUS
Status: DISCONTINUED | OUTPATIENT
Start: 2023-04-18 | End: 2023-04-18

## 2023-04-18 RX ORDER — SCOLOPAMINE TRANSDERMAL SYSTEM 1 MG/1
1 PATCH, EXTENDED RELEASE TRANSDERMAL ONCE
Status: COMPLETED | OUTPATIENT
Start: 2023-04-18 | End: 2023-04-19

## 2023-04-18 RX ORDER — ACETAMINOPHEN 325 MG/1
975 TABLET ORAL ONCE
Status: COMPLETED | OUTPATIENT
Start: 2023-04-18 | End: 2023-04-18

## 2023-04-18 RX ORDER — MAGNESIUM SULFATE 4 G/50ML
4 INJECTION INTRAVENOUS ONCE
Status: COMPLETED | OUTPATIENT
Start: 2023-04-18 | End: 2023-04-18

## 2023-04-18 RX ORDER — CELECOXIB 200 MG/1
400 CAPSULE ORAL
Status: COMPLETED | OUTPATIENT
Start: 2023-04-18 | End: 2023-04-18

## 2023-04-18 RX ORDER — NALOXONE HYDROCHLORIDE 1 MG/ML
0.2 INJECTION INTRAMUSCULAR; INTRAVENOUS; SUBCUTANEOUS
Status: DISCONTINUED | OUTPATIENT
Start: 2023-04-18 | End: 2023-04-18

## 2023-04-18 RX ORDER — KETAMINE HYDROCHLORIDE 10 MG/ML
INJECTION INTRAMUSCULAR; INTRAVENOUS PRN
Status: DISCONTINUED | OUTPATIENT
Start: 2023-04-18 | End: 2023-04-18

## 2023-04-18 RX ORDER — PROPOFOL 10 MG/ML
INJECTION, EMULSION INTRAVENOUS PRN
Status: DISCONTINUED | OUTPATIENT
Start: 2023-04-18 | End: 2023-04-18

## 2023-04-18 RX ORDER — FENTANYL CITRATE 50 UG/ML
INJECTION, SOLUTION INTRAMUSCULAR; INTRAVENOUS PRN
Status: DISCONTINUED | OUTPATIENT
Start: 2023-04-18 | End: 2023-04-18

## 2023-04-18 RX ORDER — ONDANSETRON 2 MG/ML
4 INJECTION INTRAMUSCULAR; INTRAVENOUS EVERY 30 MIN PRN
Status: DISCONTINUED | OUTPATIENT
Start: 2023-04-18 | End: 2023-04-18 | Stop reason: HOSPADM

## 2023-04-18 RX ORDER — TRAMADOL HYDROCHLORIDE 50 MG/1
100 TABLET ORAL EVERY 6 HOURS PRN
Status: DISCONTINUED | OUTPATIENT
Start: 2023-04-18 | End: 2023-04-19 | Stop reason: HOSPADM

## 2023-04-18 RX ORDER — SODIUM CHLORIDE, SODIUM LACTATE, POTASSIUM CHLORIDE, CALCIUM CHLORIDE 600; 310; 30; 20 MG/100ML; MG/100ML; MG/100ML; MG/100ML
INJECTION, SOLUTION INTRAVENOUS CONTINUOUS
Status: DISCONTINUED | OUTPATIENT
Start: 2023-04-18 | End: 2023-04-18 | Stop reason: HOSPADM

## 2023-04-18 RX ORDER — ONDANSETRON 2 MG/ML
4 INJECTION INTRAMUSCULAR; INTRAVENOUS
Status: COMPLETED | OUTPATIENT
Start: 2023-04-18 | End: 2023-04-18

## 2023-04-18 RX ORDER — NALOXONE HYDROCHLORIDE 0.4 MG/ML
0.2 INJECTION, SOLUTION INTRAMUSCULAR; INTRAVENOUS; SUBCUTANEOUS
Status: DISCONTINUED | OUTPATIENT
Start: 2023-04-18 | End: 2023-04-19 | Stop reason: HOSPADM

## 2023-04-18 RX ORDER — LIDOCAINE HYDROCHLORIDE 10 MG/ML
INJECTION, SOLUTION INFILTRATION; PERINEURAL PRN
Status: DISCONTINUED | OUTPATIENT
Start: 2023-04-18 | End: 2023-04-18

## 2023-04-18 RX ORDER — MORPHINE SULFATE 1 MG/ML
INJECTION, SOLUTION EPIDURAL; INTRATHECAL; INTRAVENOUS
Status: COMPLETED | OUTPATIENT
Start: 2023-04-18 | End: 2023-04-18

## 2023-04-18 RX ORDER — FENTANYL CITRATE 50 UG/ML
25 INJECTION, SOLUTION INTRAMUSCULAR; INTRAVENOUS EVERY 5 MIN PRN
Status: DISCONTINUED | OUTPATIENT
Start: 2023-04-18 | End: 2023-04-18 | Stop reason: HOSPADM

## 2023-04-18 RX ORDER — ACETAMINOPHEN 325 MG/10.15ML
975 LIQUID ORAL EVERY 6 HOURS
Status: DISCONTINUED | OUTPATIENT
Start: 2023-04-18 | End: 2023-04-19 | Stop reason: HOSPADM

## 2023-04-18 RX ORDER — GABAPENTIN 250 MG/5ML
250 SOLUTION ORAL EVERY 8 HOURS SCHEDULED
Status: DISCONTINUED | OUTPATIENT
Start: 2023-04-18 | End: 2023-04-19 | Stop reason: HOSPADM

## 2023-04-18 RX ORDER — ENOXAPARIN SODIUM 100 MG/ML
40 INJECTION SUBCUTANEOUS EVERY 24 HOURS
Status: DISCONTINUED | OUTPATIENT
Start: 2023-04-18 | End: 2023-04-19 | Stop reason: HOSPADM

## 2023-04-18 RX ORDER — KETOROLAC TROMETHAMINE 30 MG/ML
30 INJECTION, SOLUTION INTRAMUSCULAR; INTRAVENOUS EVERY 6 HOURS
Status: DISCONTINUED | OUTPATIENT
Start: 2023-04-18 | End: 2023-04-18

## 2023-04-18 RX ORDER — CEFAZOLIN SODIUM/WATER 2 G/20 ML
2 SYRINGE (ML) INTRAVENOUS
Status: COMPLETED | OUTPATIENT
Start: 2023-04-18 | End: 2023-04-18

## 2023-04-18 RX ORDER — MORPHINE SULFATE 1 MG/ML
150 INJECTION, SOLUTION EPIDURAL; INTRATHECAL; INTRAVENOUS ONCE
Status: DISCONTINUED | OUTPATIENT
Start: 2023-04-18 | End: 2023-04-18 | Stop reason: HOSPADM

## 2023-04-18 RX ORDER — LORAZEPAM 2 MG/ML
.5-1 INJECTION INTRAMUSCULAR EVERY 6 HOURS PRN
Status: DISCONTINUED | OUTPATIENT
Start: 2023-04-18 | End: 2023-04-19 | Stop reason: HOSPADM

## 2023-04-18 RX ORDER — ONDANSETRON 4 MG/1
4 TABLET, ORALLY DISINTEGRATING ORAL EVERY 6 HOURS PRN
Status: DISCONTINUED | OUTPATIENT
Start: 2023-04-18 | End: 2023-04-19 | Stop reason: HOSPADM

## 2023-04-18 RX ORDER — DEXAMETHASONE SODIUM PHOSPHATE 10 MG/ML
INJECTION, SOLUTION INTRAMUSCULAR; INTRAVENOUS PRN
Status: DISCONTINUED | OUTPATIENT
Start: 2023-04-18 | End: 2023-04-18

## 2023-04-18 RX ORDER — ONDANSETRON 2 MG/ML
4 INJECTION INTRAMUSCULAR; INTRAVENOUS EVERY 6 HOURS PRN
Status: DISCONTINUED | OUTPATIENT
Start: 2023-04-18 | End: 2023-04-19 | Stop reason: HOSPADM

## 2023-04-18 RX ORDER — GLYCOPYRROLATE 0.2 MG/ML
INJECTION, SOLUTION INTRAMUSCULAR; INTRAVENOUS PRN
Status: DISCONTINUED | OUTPATIENT
Start: 2023-04-18 | End: 2023-04-18

## 2023-04-18 RX ORDER — LIDOCAINE 40 MG/G
CREAM TOPICAL
Status: DISCONTINUED | OUTPATIENT
Start: 2023-04-18 | End: 2023-04-19 | Stop reason: HOSPADM

## 2023-04-18 RX ORDER — HYDROMORPHONE HYDROCHLORIDE 1 MG/ML
0.4 INJECTION, SOLUTION INTRAMUSCULAR; INTRAVENOUS; SUBCUTANEOUS EVERY 5 MIN PRN
Status: DISCONTINUED | OUTPATIENT
Start: 2023-04-18 | End: 2023-04-18 | Stop reason: HOSPADM

## 2023-04-18 RX ORDER — LIDOCAINE 40 MG/G
CREAM TOPICAL
Status: DISCONTINUED | OUTPATIENT
Start: 2023-04-18 | End: 2023-04-18 | Stop reason: HOSPADM

## 2023-04-18 RX ORDER — FENTANYL CITRATE 50 UG/ML
50 INJECTION, SOLUTION INTRAMUSCULAR; INTRAVENOUS EVERY 5 MIN PRN
Status: DISCONTINUED | OUTPATIENT
Start: 2023-04-18 | End: 2023-04-18 | Stop reason: HOSPADM

## 2023-04-18 RX ORDER — HALOPERIDOL 5 MG/ML
1 INJECTION INTRAMUSCULAR ONCE
Status: COMPLETED | OUTPATIENT
Start: 2023-04-18 | End: 2023-04-18

## 2023-04-18 RX ORDER — ENALAPRILAT 1.25 MG/ML
1.25 INJECTION INTRAVENOUS EVERY 6 HOURS PRN
Status: DISCONTINUED | OUTPATIENT
Start: 2023-04-18 | End: 2023-04-19 | Stop reason: HOSPADM

## 2023-04-18 RX ORDER — HYDROMORPHONE HYDROCHLORIDE 1 MG/ML
0.2 INJECTION, SOLUTION INTRAMUSCULAR; INTRAVENOUS; SUBCUTANEOUS EVERY 5 MIN PRN
Status: DISCONTINUED | OUTPATIENT
Start: 2023-04-18 | End: 2023-04-18 | Stop reason: HOSPADM

## 2023-04-18 RX ORDER — DIPHENHYDRAMINE HYDROCHLORIDE 50 MG/ML
INJECTION INTRAMUSCULAR; INTRAVENOUS PRN
Status: DISCONTINUED | OUTPATIENT
Start: 2023-04-18 | End: 2023-04-18

## 2023-04-18 RX ORDER — DEXTROSE MONOHYDRATE, SODIUM CHLORIDE, AND POTASSIUM CHLORIDE 50; 1.49; 4.5 G/1000ML; G/1000ML; G/1000ML
INJECTION, SOLUTION INTRAVENOUS CONTINUOUS
Status: DISCONTINUED | OUTPATIENT
Start: 2023-04-18 | End: 2023-04-19 | Stop reason: HOSPADM

## 2023-04-18 RX ADMIN — FENTANYL CITRATE 100 MCG: 50 INJECTION, SOLUTION INTRAMUSCULAR; INTRAVENOUS at 08:37

## 2023-04-18 RX ADMIN — FAMOTIDINE 20 MG: 10 INJECTION, SOLUTION INTRAVENOUS at 08:33

## 2023-04-18 RX ADMIN — CELECOXIB 400 MG: 200 CAPSULE ORAL at 08:22

## 2023-04-18 RX ADMIN — PHENYLEPHRINE HYDROCHLORIDE 200 MCG: 10 INJECTION INTRAVENOUS at 09:37

## 2023-04-18 RX ADMIN — SODIUM CHLORIDE, POTASSIUM CHLORIDE, SODIUM LACTATE AND CALCIUM CHLORIDE: 600; 310; 30; 20 INJECTION, SOLUTION INTRAVENOUS at 07:30

## 2023-04-18 RX ADMIN — GABAPENTIN 600 MG: 300 CAPSULE ORAL at 08:22

## 2023-04-18 RX ADMIN — HYOSCYAMINE SULFATE 125 MCG: 0.12 TABLET, ORALLY DISINTEGRATING ORAL at 18:10

## 2023-04-18 RX ADMIN — PROPOFOL 200 MG: 10 INJECTION, EMULSION INTRAVENOUS at 09:04

## 2023-04-18 RX ADMIN — FENTANYL CITRATE 100 MCG: 50 INJECTION, SOLUTION INTRAMUSCULAR; INTRAVENOUS at 09:04

## 2023-04-18 RX ADMIN — HALOPERIDOL LACTATE 1 MG: 5 INJECTION, SOLUTION INTRAMUSCULAR at 11:15

## 2023-04-18 RX ADMIN — ACETAMINOPHEN 1000 MG: 10 INJECTION, SOLUTION INTRAVENOUS at 13:23

## 2023-04-18 RX ADMIN — Medication 2 G: at 09:02

## 2023-04-18 RX ADMIN — ROCURONIUM BROMIDE 50 MG: 50 INJECTION, SOLUTION INTRAVENOUS at 09:05

## 2023-04-18 RX ADMIN — ENOXAPARIN SODIUM 40 MG: 40 INJECTION SUBCUTANEOUS at 21:02

## 2023-04-18 RX ADMIN — ACETAMINOPHEN 975 MG: 325 TABLET ORAL at 08:16

## 2023-04-18 RX ADMIN — FENTANYL CITRATE 25 MCG: 50 INJECTION, SOLUTION INTRAMUSCULAR; INTRAVENOUS at 10:29

## 2023-04-18 RX ADMIN — DIPHENHYDRAMINE HYDROCHLORIDE 12.5 MG: 50 INJECTION, SOLUTION INTRAMUSCULAR; INTRAVENOUS at 10:02

## 2023-04-18 RX ADMIN — FENTANYL CITRATE 25 MCG: 50 INJECTION, SOLUTION INTRAMUSCULAR; INTRAVENOUS at 10:38

## 2023-04-18 RX ADMIN — LIDOCAINE HYDROCHLORIDE 30 MG: 10 INJECTION, SOLUTION INFILTRATION; PERINEURAL at 09:04

## 2023-04-18 RX ADMIN — MAGNESIUM SULFATE HEPTAHYDRATE 4 G: 80 INJECTION, SOLUTION INTRAVENOUS at 08:28

## 2023-04-18 RX ADMIN — GLYCOPYRROLATE 0.2 MG: 0.2 INJECTION INTRAMUSCULAR; INTRAVENOUS at 09:05

## 2023-04-18 RX ADMIN — NALBUPHINE HYDROCHLORIDE 5 MG: 20 INJECTION, SOLUTION INTRAMUSCULAR; INTRAVENOUS; SUBCUTANEOUS at 16:02

## 2023-04-18 RX ADMIN — PHENYLEPHRINE HYDROCHLORIDE 100 MCG: 10 INJECTION INTRAVENOUS at 09:46

## 2023-04-18 RX ADMIN — HYOSCYAMINE SULFATE 125 MCG: 0.12 TABLET, ORALLY DISINTEGRATING ORAL at 13:23

## 2023-04-18 RX ADMIN — KETOROLAC TROMETHAMINE 30 MG: 30 INJECTION, SOLUTION INTRAMUSCULAR; INTRAVENOUS at 21:02

## 2023-04-18 RX ADMIN — ACETAMINOPHEN 975 MG: 325 SOLUTION ORAL at 21:03

## 2023-04-18 RX ADMIN — Medication 0.15 MG: at 08:37

## 2023-04-18 RX ADMIN — PHENYLEPHRINE HYDROCHLORIDE 100 MCG: 10 INJECTION INTRAVENOUS at 09:05

## 2023-04-18 RX ADMIN — ONDANSETRON 4 MG: 2 INJECTION INTRAMUSCULAR; INTRAVENOUS at 08:48

## 2023-04-18 RX ADMIN — HEPARIN SODIUM 5000 UNITS: 5000 INJECTION, SOLUTION INTRAVENOUS; SUBCUTANEOUS at 09:12

## 2023-04-18 RX ADMIN — POTASSIUM CHLORIDE, DEXTROSE MONOHYDRATE AND SODIUM CHLORIDE: 150; 5; 450 INJECTION, SOLUTION INTRAVENOUS at 13:23

## 2023-04-18 RX ADMIN — POTASSIUM CHLORIDE, DEXTROSE MONOHYDRATE AND SODIUM CHLORIDE: 150; 5; 450 INJECTION, SOLUTION INTRAVENOUS at 22:35

## 2023-04-18 RX ADMIN — PHENYLEPHRINE HYDROCHLORIDE 100 MCG: 10 INJECTION INTRAVENOUS at 09:26

## 2023-04-18 RX ADMIN — ONDANSETRON 4 MG: 2 INJECTION INTRAMUSCULAR; INTRAVENOUS at 09:46

## 2023-04-18 RX ADMIN — SUGAMMADEX 210 MG: 100 INJECTION, SOLUTION INTRAVENOUS at 09:51

## 2023-04-18 RX ADMIN — FENTANYL CITRATE 25 MCG: 50 INJECTION, SOLUTION INTRAMUSCULAR; INTRAVENOUS at 11:08

## 2023-04-18 RX ADMIN — FENTANYL CITRATE 25 MCG: 50 INJECTION, SOLUTION INTRAMUSCULAR; INTRAVENOUS at 11:29

## 2023-04-18 RX ADMIN — DEXAMETHASONE SODIUM PHOSPHATE 10 MG: 10 INJECTION, SOLUTION INTRAMUSCULAR; INTRAVENOUS at 09:05

## 2023-04-18 RX ADMIN — SODIUM CHLORIDE, POTASSIUM CHLORIDE, SODIUM LACTATE AND CALCIUM CHLORIDE: 600; 310; 30; 20 INJECTION, SOLUTION INTRAVENOUS at 07:41

## 2023-04-18 RX ADMIN — GABAPENTIN 250 MG: 250 SOLUTION ORAL at 17:16

## 2023-04-18 RX ADMIN — MIDAZOLAM HYDROCHLORIDE 2 MG: 1 INJECTION, SOLUTION INTRAMUSCULAR; INTRAVENOUS at 08:37

## 2023-04-18 RX ADMIN — SCOPALAMINE 1 PATCH: 1 PATCH, EXTENDED RELEASE TRANSDERMAL at 08:21

## 2023-04-18 RX ADMIN — KETAMINE HYDROCHLORIDE 50 MG: 10 INJECTION, SOLUTION INTRAMUSCULAR; INTRAVENOUS at 09:04

## 2023-04-18 RX ADMIN — HYOSCYAMINE SULFATE 125 MCG: 0.12 TABLET, ORALLY DISINTEGRATING ORAL at 22:20

## 2023-04-18 RX ADMIN — GABAPENTIN 250 MG: 250 SOLUTION ORAL at 22:20

## 2023-04-18 ASSESSMENT — ACTIVITIES OF DAILY LIVING (ADL)
ADLS_ACUITY_SCORE: 18
ADLS_ACUITY_SCORE: 33
ADLS_ACUITY_SCORE: 18

## 2023-04-18 NOTE — PHARMACY-ADMISSION MEDICATION HISTORY
Pharmacist Admission Medication History    Admission medication history is complete. The information provided in this note is only as accurate as the sources available at the time of the update.    Medication reconciliation/reorder completed by provider prior to medication history? No    Information Source(s): Patient and CareEverywhere/SureScripts via in-person with     Pertinent Information: n/a    Changes made to PTA medication list:    Added: None    Deleted: None    Changed: None    Medication Affordability:  Not including over the counter (OTC) medications, was there a time in the past 12 months when you did not take your medications as prescribed because of cost?: No    Allergies reviewed with patient and updates made in EHR: yes    Medication History Completed By: Ellyn Anne, Sloane 4/18/2023 7:44 AM    Prior to Admission medications    Medication Sig Last Dose Taking? Auth Provider Long Term End Date   acetaminophen (TYLENOL) 325 MG tablet Take 2 tablets by mouth every 6 hours as needed More than a month at PRN Yes Reported, Patient     Cholecalciferol (VITAMIN D) 125 MCG (5000 UT) capsule Take 1 capsule (5,000 Units) by mouth daily 4/16/2023 at PM Yes Liset Burton MD  10/6/23   omeprazole (PRILOSEC) 20 MG DR capsule Take 1 capsule (20 mg) by mouth daily for 90 days Start day after surgery, open contents and sprinkle on food for first 6 weeks. Take daily for 3 months after surgery.  at Not started Yes Liset Burton MD  7/18/23   ursodiol (ACTIGALL) 300 MG capsule Take 1 capsule (300 mg) by mouth 2 times daily for 180 days Start 2 weeks after surgery, do not open-take with warm liquid. Take twice a day for 6 months.  at not started Yes Liset Burton MD  10/29/23

## 2023-04-18 NOTE — ANESTHESIA PREPROCEDURE EVALUATION
Anesthesia Pre-Procedure Evaluation    Patient: Nereida Torres   MRN: [id number] : 2000        Procedure : Procedure(s):  GASTRECTOMY, SLEEVE, LAPAROSCOPIC          Past Medical History:   Diagnosis Date     Adjustment disorder with depressed mood      Fatigue      Insomnia      Low back pain      Migraine      Morbid obesity (H)       Past Surgical History:   Procedure Laterality Date     C/SECTION, LOW TRANSVERSE       D & C      retained placenta     TONSILLECTOMY        No Known Allergies   Social History     Tobacco Use     Smoking status: Never     Smokeless tobacco: Never     Tobacco comments:     tried it in HS   Vaping Use     Vaping status: Not on file   Substance Use Topics     Alcohol use: Yes     Comment: rare. 0-3/month      Wt Readings from Last 1 Encounters:   23 104.7 kg (230 lb 12.8 oz)        Anesthesia Evaluation   Pt has had prior anesthetic. Type: Regional.    History of anesthetic complications (multiple attempts for LE)       ROS/MED HX  ENT/Pulmonary:  - neg pulmonary ROS     Neurologic:  - neg neurologic ROS     Cardiovascular:  - neg cardiovascular ROS     METS/Exercise Tolerance: >4 METS    Hematologic:  - neg hematologic  ROS     Musculoskeletal:  - neg musculoskeletal ROS     GI/Hepatic:  - neg GI/hepatic ROS     Renal/Genitourinary:  - neg Renal ROS     Endo:     (+) Obesity (morbid),     Psychiatric/Substance Use:  - neg psychiatric ROS     Infectious Disease:  - neg infectious disease ROS     Malignancy:  - neg malignancy ROS     Other:  - neg other ROS          Physical Exam    Airway  airway exam normal      Mallampati: II   TM distance: > 3 FB   Neck ROM: full   Mouth opening: > 3 cm    Respiratory Devices and Support         Dental       (+) Minor Abnormalities - some fillings, tiny chips      Cardiovascular   cardiovascular exam normal          Pulmonary   pulmonary exam normal                OUTSIDE LABS:  CBC:   Lab Results   Component Value Date     WBC 7.3 09/22/2022    HGB 12.4 09/22/2022    HCT 37.5 09/22/2022     09/22/2022     BMP:   Lab Results   Component Value Date     09/22/2022    POTASSIUM 4.1 09/22/2022    CHLORIDE 104 09/22/2022    CO2 22 09/22/2022    BUN 13.1 09/22/2022    CR 0.66 09/22/2022    GLC 81 04/18/2023    GLC 95 09/22/2022     COAGS: No results found for: PTT, INR, FIBR  POC: No results found for: BGM, HCG, HCGS  HEPATIC:   Lab Results   Component Value Date    ALBUMIN 4.5 09/22/2022    PROTTOTAL 7.4 09/22/2022    ALT 27 09/22/2022    AST 28 09/22/2022    ALKPHOS 80 09/22/2022    BILITOTAL 0.3 09/22/2022     OTHER:   Lab Results   Component Value Date    A1C 5.4 09/22/2022    JU 9.0 09/22/2022    TSH 1.78 09/22/2022       Anesthesia Plan    ASA Status:  3   NPO Status:  NPO Appropriate    Anesthesia Type: General.     - Airway: ETT   Induction: Intravenous, Propofol.   Maintenance: Balanced.        Consents    Anesthesia Plan(s) and associated risks, benefits, and realistic alternatives discussed. Questions answered and patient/representative(s) expressed understanding.    - Discussed:     - Discussed with:  Patient, Spouse,       - Extended Intubation/Ventilatory Support Discussed: No.      - Patient is DNR/DNI Status: No    Use of blood products discussed: No .     Postoperative Care    Pain management: IV analgesics, Multi-modal analgesia, intrathecal morphine.   PONV prophylaxis: Ondansetron (or other 5HT-3), Dexamethasone or Solumedrol, Droperidol or Haldol, Background Propofol Infusion, Scopolamine patch     Comments:    Other Comments: 50 mg ketamine IV on induction.  4 grams magnesium IV.            Fritz Ceja MD

## 2023-04-18 NOTE — PROGRESS NOTES
"    Care Plan Progress Note:    Name: Nereida Torres  :   2000  MRN:   [id number]    Problem: Bariatric Procedure  Goal: Prevent post-op bariatric complications.  Appropriate oral intake.  Discharge needs are met.  Intervention:   Post Op Day 0/1 (progress as patient tolerates)   -Notify MD of excessive nausea   -NPO per MD orders; read exceptions to the order   -Toothette with 1/2 inch warm water at bedside until able to take PO   -Do not give ice chips, straws, or carbonation    -Whenever drinking liquids, patient must be upright with both feet on the floor   -No more than 30mL per sip   -All liquids must be at approximately room temperature (no extreme temperatures).   -After 2 hours of 30mL PO q30min, you may take 30mL as tolerated and advance to a clear liquid diet    -No oral pills until after the patient tolerates the 2 hours of 30mL sips (exceptions: sublingual medications can be given anytime; liquid gabapentin can be given after 1 hours of sips)   -Strict intake and output   -Bladder scan every 4 hours until voiding freely   -If tolerating sips, start bariatric clear liquid diet   -If tolerating bariatric clears, advance to a bariatric full liquid diet  Discharge Planning   -Educate patient about pill size   -Patient should take no pill greater than 1/4 inch   -Send pill cutter home with patient   -Nurse to review home discharge instructions  Outcome:    Shift - PATIENT ARRIVED IN THE UNIT AT 1250  Intake: 30mL, started sips at 1445  Output: 0mL  Bladder Scan: n/a; will bladder scan at 1645  Pain: 3/10, \"tolerable\"  Incision: covered, dressings C/D/I  Nausea: denies  Activity: ambulated in the room; ed given re: post activity   Incentive Spirometry: initial education given, achieves 3000mL, tolerating well  Abdominal Binder: at bedside     used via [employer].      Sia Rocha RN  2023  2:59 PM      "

## 2023-04-18 NOTE — ANESTHESIA PROCEDURE NOTES
Airway       Patient location during procedure: OR       Procedure Start/Stop Times: 4/18/2023 9:07 AM  Staff -        Anesthesiologist:  Fritz Ceja MD       CRNA: Margareth Meehan APRN CRNA       Performed By: CRNAIndications and Patient Condition       Indications for airway management: lucretia-procedural       Induction type:intravenous       Mask difficulty assessment: 1 - vent by mask    Final Airway Details       Final airway type: endotracheal airway       Successful airway: ETT - single and Oral  Endotracheal Airway Details        ETT size (mm): 7.0       Cuffed: yes       Successful intubation technique: direct laryngoscopy       DL Blade Type: R[place] 2       Grade View of Cords: 1       Adjucts: stylet       Position: Right       Measured from: gums/teeth       Secured at (cm): 22       Bite block used: None    Post intubation assessment        Placement verified by: capnometry, equal breath sounds and chest rise        Number of attempts at approach: 1       Number of other approaches attempted: 0       Secured with: silk tape       Ease of procedure: easy       Dentition: Intact and Unchanged       Dental guard used and removed.    Medication(s) Administered   Medication Administration Time: 4/18/2023 9:07 AM

## 2023-04-18 NOTE — ANESTHESIA CARE TRANSFER NOTE
Patient: Nereida Torres    Procedure: Procedure(s):  GASTRECTOMY, SLEEVE, LAPAROSCOPIC       Diagnosis: Class 3 severe obesity due to excess calories without serious comorbidity with body mass index (BMI) of 40.0 to 44.9 in adult (H) [E66.01, Z68.41]  Diagnosis Additional Information: No value filed.    Anesthesia Type:   General     Note:    Oropharynx: oropharynx clear of all foreign objects and spontaneously breathing  Level of Consciousness: awake  Oxygen Supplementation: face mask  Level of Supplemental Oxygen (L/min / FiO2): 8  Independent Airway: airway patency satisfactory and stable  Dentition: dentition unchanged  Vital Signs Stable: post-procedure vital signs reviewed and stable  Report to RN Given: handoff report given  Patient transferred to: PACU    Handoff Report: Identifed the Patient, Identified the Reponsible Provider, Reviewed the pertinent medical history, Discussed the surgical course, Reviewed Intra-OP anesthesia mangement and issues during anesthesia, Set expectations for post-procedure period and Allowed opportunity for questions and acknowledgement of understanding      Vitals:  Vitals Value Taken Time   /63 04/18/23 1000   Temp 97.6    Pulse 103 04/18/23 1006   Resp 11 04/18/23 1006   SpO2 100 % 04/18/23 1006   Vitals shown include unvalidated device data.    Electronically Signed By: ANNALEE Hicks CRNA  April 18, 2023  10:08 AM

## 2023-04-18 NOTE — INTERVAL H&P NOTE
"I have reviewed the surgical (or preoperative) H&P that is linked to this encounter, and examined the patient. There are no significant changes    Randall Palacio MD, [facility]  Office: [phone]  [facility]   General and Bariatric Surgery      Clinical Conditions Present on Arrival:  Clinically Significant Risk Factors Present on Admission                  # Obesity: Estimated body mass index is 39.62 kg/m  as calculated from the following:    Height as of 3/29/23: 1.626 m (5' 4\").    Weight as of this encounter: 104.7 kg (230 lb 12.8 oz).       "

## 2023-04-18 NOTE — ANESTHESIA PROCEDURE NOTES
"Intrathecal injection Procedure Note    Pre-Procedure   Staff -        Anesthesiologist:  Fritz Ceja MD       Performed By: anesthesiologist       Location: pre-op       Procedure Start/Stop Times: 4/18/2023 8:37 AM and 4/18/2023 8:47 AM       Pre-Anesthestic Checklist: patient identified, IV checked, risks and benefits discussed, informed consent, monitors and equipment checked, pre-op evaluation, at physician/surgeon's request and post-op pain management  Timeout:       Correct Patient: Yes        Correct Procedure: Yes        Correct Site: Yes        Correct Position: Yes   Procedure Documentation  Procedure: intrathecal injection       Patient Position: sitting       Patient Prep/Sterile Barriers: sterile gloves, mask, patient draped       Skin prep: Chloraprep       Insertion Site: L2-3. (midline approach).       Needle Gauge: 24.        Needle Length (Inches): 4        Introducer used       # of attempts: 1 and  # of redirects:  0    Assessment/Narrative         Paresthesias: No.       Sensory Level: T5       CSF fluid: clear.    Medication(s) Administered   Morphine PF 1 mg/mL (Intrathecal) - Intrathecal   0.15 mg - 4/18/2023 8:37:00 AM  Medication Administration Time: 4/18/2023 8:37 AM      FOR [facility] ([facility]) ONLY:   Pain Team Contact information: please page the Pain Team Via [employer]. Search \"Pain\". During daytime hours, please page the attending first. At night please page the resident first.      "

## 2023-04-18 NOTE — ANESTHESIA POSTPROCEDURE EVALUATION
Patient: Nereida Torres    Procedure: Procedure(s):  GASTRECTOMY, SLEEVE, LAPAROSCOPIC       Anesthesia Type:  General    Note:  Disposition: Admission   Postop Pain Control: Uneventful            Sign Out: Well controlled pain   PONV: Yes            Symptoms: Nausea only            Sign Out: PONV/POV resolved with treatment   Neuro/Psych: Uneventful            Sign Out: Acceptable/Baseline neuro status   Airway/Respiratory: Uneventful            Sign Out: Acceptable/Baseline resp. status   CV/Hemodynamics: Uneventful            Sign Out: Acceptable CV status; No obvious hypovolemia; No obvious fluid overload   Other NRE: NONE   DID A NON-ROUTINE EVENT OCCUR? No           Last vitals:  Vitals Value Taken Time   /54 04/18/23 1100   Temp 36.6  C (97.8  F) 04/18/23 1042   Pulse 87 04/18/23 1106   Resp 16 04/18/23 1106   SpO2 100 % 04/18/23 1106   Vitals shown include unvalidated device data.    Electronically Signed By: Fritz Ceja MD  April 18, 2023  11:08 AM

## 2023-04-18 NOTE — OP NOTE
[facility]  Operative Note    Pre-operative diagnosis: Class 3 severe obesity due to excess calories without serious comorbidity with body mass index (BMI) of 40.0 to 44.9 in adult (H) [E66.01, Z68.41]   Post-operative diagnosis morbid obesity   Procedure: Procedure(s):  GASTRECTOMY, SLEEVE, LAPAROSCOPIC   Surgeon: Randall Palacio MD   Assistants(s): The assistance of Verito Arndt CNP, was necessary for retraction and exposure during the course of the case.   Anesthesia: General with Block    Estimated blood loss: Less than 50 ml        Operative Indication:  Nereida Torres has a Body mass index is 39.62 kg/m . . she has attempted weight loss through medical management, diet, and exercise alone without longterm success.  she is therefore pursuing bariatric surgery as a means of achieving long term weight loss and resolution of her bariatric comorbidities.       Drains: None   Specimens: Sleeve Gastrectomy       Findings: None.   Complications: None.           Description of procedure:    The patient was brought to the operating room where after induction of general anesthesia with endotracheal intubation, they were positioned with both arms out.  After a procedural pause, we began by injecting quarter percent Marcaine and the skin 20 cm inferior to the xiphoid process and just to the left of midline.  This was then sharply incised and access to the abdomen gained with an optical 10 mm trocar.  The abdomen was insufflated.  We then proceeded to place 2 additional 5 mm ports, and one additional 15 mm port across the upper abdomen after injection of local anesthetic.  An incision was made in the epigastric region for placement of the Harman liver retractor.  Bilateral TAP blocks were performed under visual guidance with 0.25% marcaine, 25 cc per side.  The patient was then put into reverse Trendelenburg body positioning.    On initial survey, the stomach appeared normal with  no evidence of a hiatal hernia.  We began our dissection by dividing the omentum from the greater curvature of the stomach with the Harmonic scalpel.  Distally this was mobilized until we were within 6 cm of the pylorus, which was identified by palpation with the laparoscopic graspers.  Proximally this dissection was carried up along the greater curvature of the stomach, dividing the short gastric vessels and posterior adhesions between the stomach and retroperitoneum until we reached the angle of His.  The left branch of the diaphragmatic trevon was identified.  Satisfied with our mobilization of the stomach, we then had our anesthesia colleagues advance a 32 Algerian red rubber catheter into the stomach where was then manipulated until it lay flush against the lesser curvature stomach, terminating near the pylorus.      We then proceeded to begin dividing the stomach, first with a 60 mm black load Endo HEATHER stapler which was positioned adjacent to the red rubber catheter across the antrum, with approximately 1 cm of distance between the lateral edge of the catheter and medial edge of the stapler.  Adequate space at the incisura was ensured.  After firing the staple load, we then proceeded to perform the remainder of the sleeve gastrectomy, dividing the stomach with sequential firings of the green, yellow, and blue staple loads until we reached the angle of His.  For each firing, we ensured there is approximately 1 cm of space between the lateral aspect of the catheter and medial aspect of the stapler.  No staple line reinforcement was utilized.  The staple line was not over sewn.  A total of 5 staple loads were utilized.  With the stomach thus fully resected, we inspected our staple line which appeared hemostatic.  Clips were then placed along the staple line we could see vessels extending visibly to the edge.  We then sutured the proximal aspect of the staple line to the left branch of the diaphragmatic trevon with a  2-0 silk suture.      The resected stomach was then removed from the abdominal cavity through the 15 mm port site.  This fascial defect was then closed with a interrupted 0 Vicryl suture.  The remainder of the local anesthetic was then injected into the skin and fascia surrounding the port sites.  The Harman liver retractor was removed under direct visualization and insufflation then released.  The ports were then removed and the skin closed with interrupted 4-0 Vicryl sutures.      Randall Palacio MD, FACS  Office: [phone]  [facility]   General and Bariatric Surgery

## 2023-04-19 VITALS
WEIGHT: 233.4 LBS | DIASTOLIC BLOOD PRESSURE: 56 MMHG | BODY MASS INDEX: 39.85 KG/M2 | HEART RATE: 53 BPM | OXYGEN SATURATION: 100 % | TEMPERATURE: 97.7 F | RESPIRATION RATE: 18 BRPM | SYSTOLIC BLOOD PRESSURE: 106 MMHG | HEIGHT: 64 IN

## 2023-04-19 PROCEDURE — 250N000013 HC RX MED GY IP 250 OP 250 PS 637: Performed by: NURSE PRACTITIONER

## 2023-04-19 PROCEDURE — 250N000011 HC RX IP 250 OP 636: Performed by: NURSE PRACTITIONER

## 2023-04-19 PROCEDURE — 250N000011 HC RX IP 250 OP 636: Performed by: SURGERY

## 2023-04-19 RX ORDER — ACETAMINOPHEN 325 MG/1
650 TABLET ORAL EVERY 6 HOURS PRN
COMMUNITY
Start: 2023-04-19

## 2023-04-19 RX ORDER — CHOLECALCIFEROL (VITAMIN D3) 125 MCG
5000 CAPSULE ORAL DAILY
COMMUNITY
Start: 2023-04-19

## 2023-04-19 RX ORDER — GABAPENTIN 250 MG/5ML
250 SOLUTION ORAL EVERY 8 HOURS PRN
Qty: 60 ML | Refills: 0 | Status: SHIPPED | OUTPATIENT
Start: 2023-04-19 | End: 2023-05-02

## 2023-04-19 RX ADMIN — ACETAMINOPHEN 975 MG: 325 SOLUTION ORAL at 02:14

## 2023-04-19 RX ADMIN — GABAPENTIN 250 MG: 250 SOLUTION ORAL at 06:52

## 2023-04-19 RX ADMIN — KETOROLAC TROMETHAMINE 30 MG: 30 INJECTION, SOLUTION INTRAMUSCULAR; INTRAVENOUS at 02:15

## 2023-04-19 RX ADMIN — ACETAMINOPHEN 975 MG: 325 SOLUTION ORAL at 13:42

## 2023-04-19 RX ADMIN — HYOSCYAMINE SULFATE 125 MCG: 0.12 TABLET, ORALLY DISINTEGRATING ORAL at 02:15

## 2023-04-19 RX ADMIN — NALBUPHINE HYDROCHLORIDE 5 MG: 20 INJECTION, SOLUTION INTRAMUSCULAR; INTRAVENOUS; SUBCUTANEOUS at 02:58

## 2023-04-19 RX ADMIN — HYOSCYAMINE SULFATE 125 MCG: 0.12 TABLET, ORALLY DISINTEGRATING ORAL at 09:05

## 2023-04-19 RX ADMIN — GABAPENTIN 250 MG: 250 SOLUTION ORAL at 13:42

## 2023-04-19 RX ADMIN — ONDANSETRON 4 MG: 2 INJECTION INTRAMUSCULAR; INTRAVENOUS at 09:05

## 2023-04-19 RX ADMIN — KETOROLAC TROMETHAMINE 30 MG: 30 INJECTION, SOLUTION INTRAMUSCULAR; INTRAVENOUS at 08:04

## 2023-04-19 RX ADMIN — HYOSCYAMINE SULFATE 125 MCG: 0.12 TABLET, ORALLY DISINTEGRATING ORAL at 13:42

## 2023-04-19 RX ADMIN — OMEPRAZOLE 20 MG: 20 CAPSULE, DELAYED RELEASE ORAL at 08:04

## 2023-04-19 RX ADMIN — HYOSCYAMINE SULFATE 125 MCG: 0.12 TABLET, ORALLY DISINTEGRATING ORAL at 06:53

## 2023-04-19 ASSESSMENT — ACTIVITIES OF DAILY LIVING (ADL)
ADLS_ACUITY_SCORE: 18

## 2023-04-19 NOTE — PROVIDER NOTIFICATION
PROVIDER NOTIFICATION    Reason for communication: Patient only had scant amount of urine, <50mL since 0700. Bladder scanned for 475mL. Straight cath was performed on NOCS at 0230 for 750mL.  Team member name: Dr. Palacio  Team member role: Primary Team - Surgery  Method of Communication: Face to Face  Response: encourage to take a shower; if not helpful then update provider  Response time: n/a

## 2023-04-19 NOTE — DISCHARGE SUMMARY
Bariatric Surgery Discharge Summary    Primary Care Physician:  Shila Reddy    Discharge Provider: ANNALEE Morales CNP  Admission Date: 4/18/2023  Discharge Date: 4/19/2023     Primary Diagnosis at Discharge:   Patient Active Problem List   Diagnosis     Morbid obesity (H)     Fatigue     Morbid (severe) obesity due to excess calories (H)      Disposition: Home or Self Care  Condition at Discharge: Stable     Surgery: Laparoscopic sleeve gastrectomy    Hospital Summary:   Nereida Torres was admitted for morbid obesity.  she then underwent an uncomplicated laparoscopic sleeve gastrectomy.  Following a brief recovery in the PACU, she was transferred to the Med/Surg floor for the remainder of her stay.  her post operative course was uneventful.  On POD # 1 she was discharged home tolerating a full liquid diet, ambulating without assistance, and pain controlled with oral analgesics.        Discharge Medications:      Medication List      Started    gabapentin 250 MG/5ML solution  Commonly known as: NEURONTIN  250 mg, Oral, EVERY 8 HOURS PRN     hyoscyamine 0.125 MG sublingual tablet  Commonly known as: LEVSIN/SL  125 mcg, Sublingual, EVERY 4 HOURS PRN        Modified    * acetaminophen 325 MG tablet  Commonly known as: TYLENOL  What changed: additional instructions     * Tylenol Dissolve Packs 500 MG Pack  Generic drug: Acetaminophen  1,000 mg, Oral, EVERY 6 HOURS  What changed: You were already taking a medication with the same name, and this prescription was added. Make sure you understand how and when to take each.     Vitamin D 125 MCG (5000 UT) capsule  What changed: additional instructions         * This list has 2 medication(s) that are the same as other medications prescribed for you. Read the directions carefully, and ask your doctor or other care provider to review them with you.                Discharge Instructions:  Follow up appointment with Primary Care Physician: Barry  Shila  Follow up appointment with Dr. Palacio in 2 weeks  Attend the post-op dietary class as scheduled    Post operative instructions:   Diet:     For two weeks following your surgery or until you meet with the dietician, you will be on a full liquid diet.  It is extremely important to follow the liquid diet to allow for optimal healing and to prevent food from becoming lodged at the pouch outlet.    Protein is an important part of the diet after surgery; therefore, it is necessary to drink fluids that are high in protein.  Proteins are building blocks that help you heal after surgery and help preserve your muscle mass while you are losing weight.      Including carbohydrates in the diet is also important after surgery to prevent your body from burning fat for energy (Ketosis).  These carbohydrates include: unsweetened applesauce, blended canned fruit (in its own juice), Stage I baby food fruit, thin cream of wheat, light yogurt (no fruit chunks), or 100% fruit juice diluted (50% juice/50% water).     Remember to take 1 Multivitamin with Iron 2 times daily and 1000 mcg of Sublingual B-12 daily.       Aim for 40-50 grams of protein daily during this week.    Sip 48-64 ounces of liquid per day in addition to full liquid meals/supplements.    After 2 weeks of the full liquid diet, you will advance to the pureed diet, as instructed.    Activity: You should continue to be active at home including ambulating frequently.  If possible try to limit the amount of time spent in bed.    Restrictions: you should avoid lifting anything more than 20 pounds or strenuous physical activity for a total of 2 weeks.  This is to help reduce the risk of hernia  formation at your port sites.  Walking does not count as strenuous physical activity.  You are safe to walk up and down stairs.  Following 2 weeks he may resume all normal physical activity.    Wound / drain care:You may remove your outer dressings after a period of 48 hours.  The  small white strips on the incisions act like artificial scabs, and will begin to peel at the edges at around 7-10 days.  These can then be removed.     Bathing: You may shower after 48 hours from surgery.  It is ok to get your incisions wet, but avoid rubbing them.  Avoid soaking in bath tubs, or swimming in lakes, pools, or streams for 4 weeks following surgery.      Verito Arndt, CNP  [phone]  [facility] General and B[facility]ric [facility]

## 2023-04-19 NOTE — PROGRESS NOTES
"Bariatric Surgery Progress Note    1 Day Post-Op    Procedure(s):  GASTRECTOMY, SLEEVE, LAPAROSCOPIC    Subjective:   Patient reports pain is controlled. Patient is tolerating bariatric full liquid diet, ambulating, voiding, and passing flatus. Patient denies fever, chills, nausea, vomiting, SOB, CP, and leg pain.    Patient Vitals for the past 24 hrs:   BP Temp Temp src Pulse Resp SpO2 Height Weight   04/19/23 0737 106/56 97.7  F (36.5  C) Oral 53 18 100 % -- --   04/19/23 0602 101/61 98.4  F (36.9  C) Oral 51 16 96 % -- --   04/18/23 2358 94/53 97.6  F (36.4  C) Oral 52 18 100 % -- --   04/18/23 1936 104/52 97.8  F (36.6  C) Oral 62 16 99 % -- --   04/18/23 1530 98/50 -- -- -- -- -- -- --   04/18/23 1521 (!) 85/46 97.5  F (36.4  C) Oral 65 16 97 % -- --   04/18/23 1426 94/52 98.5  F (36.9  C) Oral 64 16 100 % -- --   04/18/23 1345 96/50 97.8  F (36.6  C) Oral 61 16 100 % -- --   04/18/23 1252 102/51 97.8  F (36.6  C) Oral 64 15 99 % 1.626 m (5' 4\") 105.9 kg (233 lb 6.4 oz)   04/18/23 1200 107/53 -- -- 68 15 100 % -- --       Physical Exam:  General: A/O, NAD  Ab:       Soft, + BS, expected ttp POD 1; The wound/ dressings are clean, dry, and intact  :      Patient is voiding adequate amount    Admission on 04/18/2023   Component Date Value     GLUCOSE BY METER POCT 04/18/2023 81      Creatinine 04/18/2023 0.71      GFR Estimate 04/18/2023 >90      Platelet Count 04/18/2023 200        Assessment/Plan:   Patient is progressing well after surgery. Once she is meeting oral intake goals, she should be able to discharge home today. Discharge orders and instructions are placed    Discussed discharge instructions with the patient along with signs and symptoms to watch for post-op.  Patient verbalized understanding of the plan, including:    - Use of incentive spirometer and walking as tolerated   - Use of abdominal binder if needed   - Importance of getting 48-64 ounces of water in daily for hydration    - Use of " medication cups for measuring out sips for the next 3-4 days.   - Bariatric full liquid diet for the next 1 week(s).   - Drink a protein shake providing 20-30 grams of protein in addition to meals daily; aim for a total of 40 grams of protein daily   - Attend the post-op dietary class next Monday at 10 am   - Take omeprazole 20 mg daily for the next 3 months and avoid/eliminate NSAID usage   - Take chewable MVI with 18mg iron twice a day (no gummies) and SL B12 1,000-2,500 mcg daily.    - Call Bariatric clinic with any questions or concerns   - If patient needs to be seen in the emergency department for any reason, she needs to return to [facility].    Pt is scheduled to follow up at Bariatric Clinic next week.  Patient verbalized understanding of the plan. Bariatric nurse clinician will call her in a couple days when she gets home to check in with her.    Greater than 45 minutes were spent with this patient with more than 50% of that time spent on education.    Verito Arndt, CNP  [phone]  [facility] General and Bariatric Surgery

## 2023-04-19 NOTE — DISCHARGE INSTRUCTIONS
If you are worried about whether or not you need to be seen or if something is wrong, please call your bariatric nurse line at [phone] or the bariatric clinic at [phone]. If you need to be seen in the emergency department for any reason in the next 30 days, please return to [facility]. The bariatric team should be involved in your care/diet even if the problem is unrelated to your surgery.

## 2023-04-19 NOTE — PROGRESS NOTES
"    Care Plan Progress Note:    Name: Nereida Torres  :   2000  MRN:   [id number]    Problem: Bariatric Procedure  Goal: Prevent post-op bariatric complications.  Appropriate oral intake.  Discharge needs are met.  Intervention:   Post Op Day 0/1 (progress as patient tolerates)   -Notify MD of excessive nausea   -NPO per MD orders; read exceptions to the order   -Toothette with 1/2 inch warm water at bedside until able to take PO   -Do not give ice chips, straws, or carbonation    -Whenever drinking liquids, patient must be upright with both feet on the floor   -No more than 30mL per sip   -All liquids must be at approximately room temperature (no extreme temperatures).   -After 2 hours of 30mL PO q30min, you may take 30mL as tolerated and advance to a clear liquid diet    -No oral pills until after the patient tolerates the 2 hours of 30mL sips (exceptions: sublingual medications can be given anytime; liquid gabapentin can be given after 1 hours of sips)   -Strict intake and output   -Bladder scan every 4 hours until voiding freely   -If tolerating sips, start bariatric clear liquid diet   -If tolerating bariatric clears, advance to a bariatric full liquid diet  Discharge Planning   -Educate patient about pill size   -Patient should take no pill greater than 1/4 inch   -Send pill cutter home with patient   -Nurse to review home discharge instructions  Outcome:    Shift  -  0700 - 1500  Intake: 240mL  Output: 150mL  Bladder Scan: Last bladder scan at 476mL. She voided after this was performed. Able to freely void then.  Pain: <4/10 on abdomen, \"tolerable.\" 6/10 on bilateral shoulder this afternoon. Advised to to warm packs, mobilize BUE at home.  Incision: Lap sites GEMA, C/D/I.  Nausea: Noted in am with episode of spasms, without emesis. PRN Zofran given x1, effective.  Activity: ambulating independently  Incentive Spirometry: 3000mL, proactive  Abdominal Binder: not used, available at " bedside    Discharged to home at 1416. Ambulatory, accompanied by significant other.  Discharge orders reviewed with the patient. No concerns.   Home med sent with: Tylenol, Gabapentn, Levsin.   Patient to  multivitamins at her pharmacy ([facility]). Our discharge pharmacy is out of      supply, and will forward the order/request to them.          Sia Rocha RN  4/19/2023  2:14 PM

## 2023-04-19 NOTE — PROGRESS NOTES
Education was provided on the following prior to discharge:    You will remain on a full liquid diet until you follow up in the post op class with the dietician.  It is extremely important to follow the liquid diet to allow for optimal healing and to prevent food from becoming lodged at the pouch outlet.     Protein is an important part of the diet after surgery; therefore, it is necessary to drink fluids that are high in protein.  Proteins are building blocks that help you heal after surgery and help preserve your muscle mass while you are losing weight, aim for 40-50g daily for the first week      Remember to take 1 Multivitamin with Iron 2 times daily and 1000 mcg of Sublingual B-12 daily.     Sip 48-64 ounces of liquid per day in addition to full liquid meals/supplements, increase liquids slowly using 1oz measuring cup. After day 4 you are able to drink normally.      After 1 week of the full liquid diet, you will advance to the pureed diet, as instructed.      Luna Dickson RD

## 2023-04-19 NOTE — PLAN OF CARE
Care Plan Progress Note:    Name: Nereida Torres  :   2000  MRN:   [id number]    Problem: Bariatric Procedure  Goal: Prevent post-op bariatric complications.  Appropriate oral intake.  Discharge needs are met.  Intervention:   Post Op Day 0/1 (progress as patient tolerates)   -Notify MD of excessive nausea   -NPO per MD orders; read exceptions to the order   -Toothette with 1/2 inch warm water at bedside until able to take PO   -Do not give ice chips, straws, or carbonation    -Whenever drinking liquids, patient must be upright with both feet on the floor   -No more than 30mL per sip   -All liquids must be at approximately room temperature (no extreme temperatures).   -After 2 hours of 30mL PO q30min, you may take 30mL as tolerated and advance to a clear liquid diet    -No oral pills until after the patient tolerates the 2 hours of 30mL sips (exceptions: sublingual medications can be given anytime; liquid gabapentin can be given after 1 hours of sips)   -Strict intake and output   -Bladder scan every 4 hours until voiding freely   -If tolerating sips, start bariatric clear liquid diet   -If tolerating bariatric clears, advance to a bariatric full liquid diet  Discharge Planning   -Educate patient about pill size   -Patient should take no pill greater than 1/4 inch   -Send pill cutter home with patient   -Nurse to review home discharge instructions  Outcome:    Shift  Intake:120 mL PO, 1034 mL IVF  Output: st cath @ 0230 750 mL  Bladder Scan: 727@0215 425@ 0645  Pain: denies  Incision: C/D/I  Nausea: denies  Activity: SBA to bathroom, ambulated in the hallways this am  Incentive Spirometry: done with encouragement  Abdominal Binder: declines placement    Skip Lovell, RN  2023  5:39 AM

## 2023-04-19 NOTE — PROGRESS NOTES
Care Plan Progress Note:    Name: Nereida Torres  :   2000  MRN:   [id number]    Problem: Bariatric Procedure  Goal: Prevent post-op bariatric complications.  Appropriate oral intake.  Discharge needs are met.  Intervention:   Post Op Day 0/1 (progress as patient tolerates)   -Notify MD of excessive nausea   -NPO per MD orders; read exceptions to the order   -Toothette with 1/2 inch warm water at bedside until able to take PO   -Do not give ice chips, straws, or carbonation    -Whenever drinking liquids, patient must be upright with both feet on the floor   -No more than 30mL per sip   -All liquids must be at approximately room temperature (no extreme temperatures).   -After 2 hours of 30mL PO q30min, you may take 30mL as tolerated and advance to a clear liquid diet    -No oral pills until after the patient tolerates the 2 hours of 30mL sips (exceptions: sublingual medications can be given anytime; liquid gabapentin can be given after 1 hours of sips)   -Strict intake and output   -Bladder scan every 4 hours until voiding freely   -If tolerating sips, start bariatric clear liquid diet   -If tolerating bariatric clears, advance to a bariatric full liquid diet  Discharge Planning   -Educate patient about pill size   -Patient should take no pill greater than 1/4 inch   -Send pill cutter home with patient   -Nurse to review home discharge instructions  Outcome:    Shift  Intake: po 390mL, 1000 mL IVF  Output: 500mL,550mL straight cath'd  Bladder Scan: 529mL, 636mL  Pain: denies  Incision: CDI  Nausea: minimal, took sips of dinner too fast, slowed down and improved  Activity: in room independently  Incentive Spirometry: 3000, self administered  Abdominal Binder: in room  Nubain at 1602 for itching, helpful    Terry Cox, RN  2023  10:56 PM

## 2023-04-20 ENCOUNTER — TELEPHONE (OUTPATIENT)
Dept: SURGERY | Facility: CLINIC | Age: 23
End: 2023-04-20
Payer: COMMERCIAL

## 2023-04-20 DIAGNOSIS — Z98.84 S/P BARIATRIC SURGERY: Primary | ICD-10-CM

## 2023-04-20 RX ORDER — TRAMADOL HYDROCHLORIDE 50 MG/1
50 TABLET ORAL EVERY 6 HOURS PRN
Qty: 10 TABLET | Refills: 0 | Status: CANCELLED | OUTPATIENT
Start: 2023-04-20 | End: 2023-04-23

## 2023-04-20 RX ORDER — TRAMADOL HYDROCHLORIDE 50 MG/1
50 TABLET ORAL EVERY 6 HOURS PRN
Qty: 10 TABLET | Refills: 0 | Status: SHIPPED | OUTPATIENT
Start: 2023-04-20 | End: 2023-04-23

## 2023-04-20 NOTE — TELEPHONE ENCOUNTER
Post-Op Phone Call  [facility] Bariatric Care    Surgeon: Randall Palacio MD.  Date of Surgery: 4/18/2023  Discharge Date: 4/19/2023    Date/Time Called:   Date: 4/20/2023 Time: 3:42 PM   Attempt: First    Pain Control:  Intensity: 6-8/10 -   Duration/Location/Explain: right side of her belly near the incisions and when she drinks - she gets full fast and feels like she has a lot of gas in her chest.   What makes it better/worse? Coughing up phlegm isn't helping either, pain meds help a bit and she is using the abdominal binder.  Encouraged her that things should be getting better in the next day or two and discussed adding some Ultram with Dr. Palacio which he will order.  Patient verbalized undertanding.    Medications:  Narcotic Use - No  Drug type: Gabapentin  Frequency: taking it scheduled    Non-prescription pain control: Tylenol    Other medications currently taking: See medication list for details - medication list reviewed    Complete Multivitamin + Iron BID? Yes    Vitamin B12? sublingual daily    Incisions:  Drainage? clean and dry  Comment: slight redness on the biggest incision.  She is going to keep an eye on the incision    Intake/Output:  Fluid Intake(oz/day)? 20 oz gatorade so far today.  Fluid type? Water, gatorade, and protein shakes    Heartburn? Yes  Counseling: Call if reflux, or pain with eating and drinking start  Nausea? Yes - slightly with the gas  Vomiting? No  BM? No  Gas? Yes    Voiding Frequency? 3/day     Are you using your incentive spirometer? If yes, how often? 3+/day    Any fever type symptoms? No    Walking activity? Yes  Frequency/Type: walking lots because of the gas    In Preparation for Surgery:    Is there anything you would have changed about your preparation for surgery from our clinic? If yes, what? none    On a scale of 1-5, with 5 being the highest, how was the service while you were in the hospital? 5    Is/was there anyone in particular; nurse, aide, hospital staff, that  did a great job and you would like us to recognize? If yes, whom. Don't remember names    Reminder of plan and expectations for follow-up visits given to the patient.    Thank you for your time. Please do not hesitate to call us with any questions or concerns.    Call completed by: Lety Brewer RN

## 2023-04-21 ENCOUNTER — TELEPHONE (OUTPATIENT)
Dept: SURGERY | Facility: CLINIC | Age: 23
End: 2023-04-21
Payer: COMMERCIAL

## 2023-04-21 DIAGNOSIS — R11.2 NAUSEA WITH VOMITING: Primary | ICD-10-CM

## 2023-04-21 LAB
PATH REPORT.COMMENTS IMP SPEC: NORMAL
PATH REPORT.FINAL DX SPEC: NORMAL
PATH REPORT.GROSS SPEC: NORMAL
PATH REPORT.MICROSCOPIC SPEC OTHER STN: NORMAL
PATH REPORT.RELEVANT HX SPEC: NORMAL
PHOTO IMAGE: NORMAL

## 2023-04-21 RX ORDER — ONDANSETRON 4 MG/1
4 TABLET, ORALLY DISINTEGRATING ORAL EVERY 8 HOURS PRN
Qty: 60 TABLET | Refills: 1 | Status: SHIPPED | OUTPATIENT
Start: 2023-04-21 | End: 2023-05-02

## 2023-04-21 NOTE — TELEPHONE ENCOUNTER
Called the patient and she said that started getting nauseated about an hour ago and it was just a little bit of water.  She said her stomach hurts now and she is laying down now.  She rates her pain now at a 4/10.  Yesterday she said that she had some chicken broth, chicken soup and protein shakes yesterday done through a strainer.  She has been walking frequently and still needs to have a BM.  Discussed with Dr. Palacio and he would like her to do as best she can with her fluids, focusing on small sips regularly, popsicle and we will add in some Zofran as well.  Patient verbalized understanding through the use of the .    Lety Brewre RN

## 2023-04-21 NOTE — TELEPHONE ENCOUNTER
RNY on 04/18/2023.    Pt c/o vomiting since this morning. Pt states that its clear.    Pt has been tolerating only small sips of water.     Pt pain is having pain at 7/10 to the right side.    Pt is urinating fine. Pt hasnt BM since before surgery.     Tiffanie Kinney CMA  [facility]  Surgery Clinic [facility] [place]  Weight Management Clinic [address] [address]  Office: [phone]  Fax: [phone]

## 2023-04-24 NOTE — PROGRESS NOTES
Time in: 1:00 pm  /Time out: 1:30 pm   Pt presents for 1 week post op dietitian follow up. Educated pt on pureed and soft to bariatric regular diets. Provided grocery list and sample meal plan for each diet stage.  Pt will begin pureed diet on 4/26/23 and advance as tolerated to softs/bariatric regular on 5/10/23. Instructed pt to begin 500 mg calcium citrate BID and 5000IU Vitamin D3 3 weeks post op. Pt to follow up with RD at 3 months post op.     Lillian Terrell, MIKE, LD

## 2023-04-25 ENCOUNTER — ALLIED HEALTH/NURSE VISIT (OUTPATIENT)
Dept: SURGERY | Facility: CLINIC | Age: 23
End: 2023-04-25
Payer: COMMERCIAL

## 2023-04-25 DIAGNOSIS — Z98.84 BARIATRIC SURGERY STATUS: Primary | ICD-10-CM

## 2023-04-25 PROCEDURE — 99024 POSTOP FOLLOW-UP VISIT: CPT

## 2023-05-02 ENCOUNTER — VIRTUAL VISIT (OUTPATIENT)
Dept: SURGERY | Facility: CLINIC | Age: 23
End: 2023-05-02
Payer: COMMERCIAL

## 2023-05-02 VITALS — BODY MASS INDEX: 37.05 KG/M2 | HEIGHT: 64 IN | WEIGHT: 217 LBS

## 2023-05-02 DIAGNOSIS — Z98.890 POST-OPERATIVE STATE: Primary | ICD-10-CM

## 2023-05-02 PROCEDURE — 99024 POSTOP FOLLOW-UP VISIT: CPT | Mod: 93 | Performed by: SURGERY

## 2023-05-02 NOTE — PROGRESS NOTES
"  The patient has been notified of following:     \"This telephone visit will be conducted via a call between you and your physician/provider. We have found that certain health care needs can be provided without the need for a physical exam.  This service lets us provide the care you need with a short phone conversation.  If a prescription is necessary we can send it directly to your pharmacy.  If lab work is needed we can place an order for that and you can then stop by our lab to have the test done at a later time.    Telephone visits are billed at different rates depending on your insurance coverage. During this emergency period, for some insurers they may be billed the same as an in-person visit.  Please reach out to your insurance provider with any questions.    If during the course of the call the physician/provider feels a telephone visit is not appropriate, you will not be charged for this service.\"    Patient has given verbal consent to a Telephone visit? Yes    What phone number would you like to be contacted at? [phone]    Patient would like to receive their AVS by [facility]          Distant Location (provider location):  On-site    Additional provider notes: Phone follow-up visit performed with .      HPI: Pt is here for follow up of a laparoscopic Parviz-en-Y gastric bypass 2 weeks ago. She is doing well. Taking po well, estimating she is getting in 40 oz of fluid. No vomiting, does still have some mild nausea for which she is using a scopolamine patch effectively.  No pain with drinking or eating purees. No fevers or chills. Ambulating without problems.     Current Outpatient Medications   Medication Sig Dispense Refill     acetaminophen (TYLENOL) 325 MG tablet Take 2 tablets (650 mg) by mouth every 6 hours as needed Do not take this while taking scheduled Tylenol after surgery. You can resume taking acetaminophen as needed 4 days after surgery when scheduled doses are done.       " "childrens multivitamin with iron (FLINTSTONES COMPLETE) CHEW Take 1 tablet by mouth 2 times daily for 360 days 180 tablet 3     Cholecalciferol (VITAMIN D) 125 MCG (5000 UT) capsule Take 1 capsule (5,000 Units) by mouth daily Do not start taking after surgery until advised by dietician in post-op class.       hyoscyamine (LEVSIN/SL) 0.125 MG sublingual tablet Place 1 tablet (125 mcg) under the tongue every 4 hours as needed (esophageal spasm) 20 tablet 0     omeprazole (PRILOSEC) 20 MG DR capsule Take 1 capsule (20 mg) by mouth daily for 90 days Start day after surgery, open contents and sprinkle on food for first 6 weeks. Take daily for 3 months after surgery. 90 capsule 0     ursodiol (ACTIGALL) 300 MG capsule Take 1 capsule (300 mg) by mouth 2 times daily for 180 days Start 2 weeks after surgery, do not open-take with warm liquid. Take twice a day for 6 months. 180 capsule 1         Ht 1.626 m (5' 4\")   Wt 98.4 kg (217 lb)   BMI 37.25 kg/m    Wt Readings from Last 4 Encounters:   05/02/23 98.4 kg (217 lb)   04/18/23 105.9 kg (233 lb 6.4 oz)   03/29/23 108 kg (238 lb)   03/02/23 107.5 kg (237 lb)         Body mass index is 37.25 kg/m .    EXAM:  None.  Patient notes that her incisions are healing well    Assessment/Plan: Pt s/p laparoscopic Parviz-en-Y gastric bypass. Doing well. Diet and activity discussed. She will f/u with us at the Bariatric Center in 1 month to meet with our dietician, and again at 3 months for additional follow up.    Randall Palacio MD, FACS  Office: [phone]  [facility]   General and Bariatric Surgery    Phone call duration: 5 minutes    "

## 2023-05-02 NOTE — LETTER
"    5/2/2023         RE: Nereida Torres  [address]  [place] [address]        Dear Colleague,    Thank you for referring your patient, Nereida Torres, to the [facility] SURGERY CLINIC AND BARIATRICS CARE [place]. Please see a copy of my visit note below.      The patient has been notified of following:     \"This telephone visit will be conducted via a call between you and your physician/provider. We have found that certain health care needs can be provided without the need for a physical exam.  This service lets us provide the care you need with a short phone conversation.  If a prescription is necessary we can send it directly to your pharmacy.  If lab work is needed we can place an order for that and you can then stop by our lab to have the test done at a later time.    Telephone visits are billed at different rates depending on your insurance coverage. During this emergency period, for some insurers they may be billed the same as an in-person visit.  Please reach out to your insurance provider with any questions.    If during the course of the call the physician/provider feels a telephone visit is not appropriate, you will not be charged for this service.\"    Patient has given verbal consent to a Telephone visit? Yes    What phone number would you like to be contacted at? [phone]    Patient would like to receive their AVS by [employer]          Distant Location (provider location):  On-site    Additional provider notes: Phone follow-up visit performed with .      HPI: Pt is here for follow up of a laparoscopic Parviz-en-Y gastric bypass 2 weeks ago. She is doing well. Taking po well, estimating she is getting in 40 oz of fluid. No vomiting, does still have some mild nausea for which she is using a scopolamine patch effectively.  No pain with drinking or eating purees. No fevers or chills. Ambulating without problems.     Current Outpatient Medications " "  Medication Sig Dispense Refill     acetaminophen (TYLENOL) 325 MG tablet Take 2 tablets (650 mg) by mouth every 6 hours as needed Do not take this while taking scheduled Tylenol after surgery. You can resume taking acetaminophen as needed 4 days after surgery when scheduled doses are done.       childrens multivitamin with iron (FLINTSTONES COMPLETE) CHEW Take 1 tablet by mouth 2 times daily for 360 days 180 tablet 3     Cholecalciferol (VITAMIN D) 125 MCG (5000 UT) capsule Take 1 capsule (5,000 Units) by mouth daily Do not start taking after surgery until advised by dietician in post-op class.       hyoscyamine (LEVSIN/SL) 0.125 MG sublingual tablet Place 1 tablet (125 mcg) under the tongue every 4 hours as needed (esophageal spasm) 20 tablet 0     omeprazole (PRILOSEC) 20 MG DR capsule Take 1 capsule (20 mg) by mouth daily for 90 days Start day after surgery, open contents and sprinkle on food for first 6 weeks. Take daily for 3 months after surgery. 90 capsule 0     ursodiol (ACTIGALL) 300 MG capsule Take 1 capsule (300 mg) by mouth 2 times daily for 180 days Start 2 weeks after surgery, do not open-take with warm liquid. Take twice a day for 6 months. 180 capsule 1         Ht 1.626 m (5' 4\")   Wt 98.4 kg (217 lb)   BMI 37.25 kg/m    Wt Readings from Last 4 Encounters:   05/02/23 98.4 kg (217 lb)   04/18/23 105.9 kg (233 lb 6.4 oz)   03/29/23 108 kg (238 lb)   03/02/23 107.5 kg (237 lb)         Body mass index is 37.25 kg/m .    EXAM:  None.  Patient notes that her incisions are healing well    Assessment/Plan: Pt s/p laparoscopic Parviz-en-Y gastric bypass. Doing well. Diet and activity discussed. She will f/u with us at the Bariatric Center in 1 month to meet with our dietician, and again at 3 months for additional follow up.    Randall Palacio MD, FACS  Office: [phone]  [facility]   General and Bariatric Surgery    Phone call duration: 5 minutes        Again, thank you for allowing me to " participate in the care of your patient.        Sincerely,        Randall Palacio MD

## 2023-05-19 ENCOUNTER — VIRTUAL VISIT (OUTPATIENT)
Dept: SURGERY | Facility: CLINIC | Age: 23
End: 2023-05-19
Payer: COMMERCIAL

## 2023-05-19 DIAGNOSIS — K90.9 INTESTINAL MALABSORPTION, UNSPECIFIED TYPE: ICD-10-CM

## 2023-05-19 DIAGNOSIS — Z71.3 NUTRITIONAL COUNSELING: ICD-10-CM

## 2023-05-19 DIAGNOSIS — Z98.84 S/P BARIATRIC SURGERY: Primary | ICD-10-CM

## 2023-05-19 PROCEDURE — 99024 POSTOP FOLLOW-UP VISIT: CPT | Performed by: DIETITIAN, REGISTERED

## 2023-05-19 NOTE — PROGRESS NOTES
Patient presents for 1 month post op dietitian follow up visit. Reports tolerating soft food diet well. Denies nausea/vomiting, constipation/diarrhea, or significant pain. Taking MVI and Vitamin B12 appropriately. Instructed patient to begin taking 500 mg calcium citrate BID and 5000 IU Vitamin D3. Educated patient on soft to bariatric regular diets, medications, developing an exercise regimen, and other dietary points of care. Provided education handout on items discussed. Patient to follow up with RD at 3 months post op.     Have you been to the hospital or seen by a doctor for any reason since your surgery? No    If yes:  Have you been seen in an outpatient clinic or doctors office after your surgery? Yes  If yes, was this a routine follow-up? 2 week post-op  Date of visit: 5/2/2023  Have you experienced any health problems since your surgery? No     Did you go to an Emergency Department or hospital after your surgery? No    Did you have additional surgery(ies) during this hospitalization? No  Date of surgery: 4/18/2023

## 2023-05-19 NOTE — LETTER
5/19/2023         RE: Nereida Torres  [address]  [place] [address]        Dear Colleague,    Thank you for referring your patient, Nereida Torres, to the [facility] SURGERY CLINIC AND BARIATRICS CARE [place]. Please see a copy of my visit note below.    Patient presents for 1 month post op dietitian follow up visit. Reports tolerating soft food diet well. Denies nausea/vomiting, constipation/diarrhea, or significant pain. Taking MVI and Vitamin B12 appropriately. Instructed patient to begin taking 500 mg calcium citrate BID and 5000 IU Vitamin D3. Educated patient on soft to bariatric regular diets, medications, developing an exercise regimen, and other dietary points of care. Provided education handout on items discussed. Patient to follow up with RD at 3 months post op.     Have you been to the hospital or seen by a doctor for any reason since your surgery? No    If yes:  Have you been seen in an outpatient clinic or doctors office after your surgery? Yes  If yes, was this a routine follow-up? 2 week post-op  Date of visit: 5/2/2023  Have you experienced any health problems since your surgery? No     Did you go to an Emergency Department or hospital after your surgery? No    Did you have additional surgery(ies) during this hospitalization? No  Date of surgery: 4/18/2023        Again, thank you for allowing me to participate in the care of your patient.        Sincerely,        Emilia Malagon RD

## 2023-05-29 ENCOUNTER — NURSE TRIAGE (OUTPATIENT)
Dept: NURSING | Facility: CLINIC | Age: 23
End: 2023-05-29
Payer: COMMERCIAL

## 2023-05-29 ENCOUNTER — HOSPITAL ENCOUNTER (EMERGENCY)
Facility: HOSPITAL | Age: 23
Discharge: HOME | End: 2023-05-29
Attending: EMERGENCY MEDICINE | Admitting: EMERGENCY MEDICINE
Payer: COMMERCIAL

## 2023-05-29 ENCOUNTER — APPOINTMENT (OUTPATIENT)
Dept: CT IMAGING | Facility: HOSPITAL | Age: 23
End: 2023-05-29
Attending: EMERGENCY MEDICINE
Payer: COMMERCIAL

## 2023-05-29 VITALS
HEART RATE: 70 BPM | WEIGHT: 210 LBS | OXYGEN SATURATION: 97 % | BODY MASS INDEX: 35.85 KG/M2 | RESPIRATION RATE: 20 BRPM | TEMPERATURE: 97.9 F | DIASTOLIC BLOOD PRESSURE: 64 MMHG | SYSTOLIC BLOOD PRESSURE: 116 MMHG | HEIGHT: 64 IN

## 2023-05-29 DIAGNOSIS — N83.209 RUPTURED OVARIAN CYST: ICD-10-CM

## 2023-05-29 DIAGNOSIS — R10.13 ABDOMINAL PAIN, EPIGASTRIC: ICD-10-CM

## 2023-05-29 DIAGNOSIS — R10.32 ABDOMINAL PAIN, LEFT LOWER QUADRANT: ICD-10-CM

## 2023-05-29 LAB
ALBUMIN SERPL BCG-MCNC: 4.2 G/DL (ref 3.5–5.2)
ALBUMIN UR-MCNC: 20 MG/DL
ALP SERPL-CCNC: 66 U/L (ref 35–104)
ALT SERPL W P-5'-P-CCNC: 27 U/L (ref 10–35)
ANION GAP SERPL CALCULATED.3IONS-SCNC: 11 MMOL/L (ref 7–15)
APPEARANCE UR: ABNORMAL
AST SERPL W P-5'-P-CCNC: 34 U/L (ref 10–35)
BILIRUB DIRECT SERPL-MCNC: <0.2 MG/DL (ref 0–0.3)
BILIRUB SERPL-MCNC: 0.8 MG/DL
BILIRUB UR QL STRIP: NEGATIVE
BUN SERPL-MCNC: 11.1 MG/DL (ref 6–20)
CALCIUM SERPL-MCNC: 9.1 MG/DL (ref 8.6–10)
CHLORIDE SERPL-SCNC: 105 MMOL/L (ref 98–107)
COLOR UR AUTO: YELLOW
CREAT SERPL-MCNC: 0.66 MG/DL (ref 0.51–0.95)
DEPRECATED HCO3 PLAS-SCNC: 22 MMOL/L (ref 22–29)
ERYTHROCYTE [DISTWIDTH] IN BLOOD BY AUTOMATED COUNT: 15.3 % (ref 10–15)
GFR SERPL CREATININE-BSD FRML MDRD: >90 ML/MIN/1.73M2
GLUCOSE SERPL-MCNC: 102 MG/DL (ref 70–99)
GLUCOSE UR STRIP-MCNC: NEGATIVE MG/DL
HCG UR QL: NEGATIVE
HCT VFR BLD AUTO: 39.7 % (ref 35–47)
HGB BLD-MCNC: 13.1 G/DL (ref 11.7–15.7)
HGB UR QL STRIP: NEGATIVE
KETONES UR STRIP-MCNC: >150 MG/DL
LEUKOCYTE ESTERASE UR QL STRIP: NEGATIVE
LIPASE SERPL-CCNC: 20 U/L (ref 13–60)
MCH RBC QN AUTO: 28.4 PG (ref 26.5–33)
MCHC RBC AUTO-ENTMCNC: 33 G/DL (ref 31.5–36.5)
MCV RBC AUTO: 86 FL (ref 78–100)
MUCOUS THREADS #/AREA URNS LPF: PRESENT /LPF
NITRATE UR QL: NEGATIVE
PH UR STRIP: 5.5 [PH] (ref 5–7)
PLATELET # BLD AUTO: 143 10E3/UL (ref 150–450)
POTASSIUM SERPL-SCNC: 3.8 MMOL/L (ref 3.4–5.3)
PROT SERPL-MCNC: 7.2 G/DL (ref 6.4–8.3)
RBC # BLD AUTO: 4.62 10E6/UL (ref 3.8–5.2)
RBC URINE: 1 /HPF
SODIUM SERPL-SCNC: 138 MMOL/L (ref 136–145)
SP GR UR STRIP: 1.03 (ref 1–1.03)
SQUAMOUS EPITHELIAL: 7 /HPF
UROBILINOGEN UR STRIP-MCNC: 4 MG/DL
WBC # BLD AUTO: 5.2 10E3/UL (ref 4–11)
WBC URINE: 1 /HPF

## 2023-05-29 PROCEDURE — 81001 URINALYSIS AUTO W/SCOPE: CPT | Performed by: EMERGENCY MEDICINE

## 2023-05-29 PROCEDURE — 96376 TX/PRO/DX INJ SAME DRUG ADON: CPT

## 2023-05-29 PROCEDURE — 36415 COLL VENOUS BLD VENIPUNCTURE: CPT | Performed by: EMERGENCY MEDICINE

## 2023-05-29 PROCEDURE — 81025 URINE PREGNANCY TEST: CPT | Performed by: EMERGENCY MEDICINE

## 2023-05-29 PROCEDURE — 250N000011 HC RX IP 250 OP 636: Performed by: EMERGENCY MEDICINE

## 2023-05-29 PROCEDURE — 80053 COMPREHEN METABOLIC PANEL: CPT | Performed by: EMERGENCY MEDICINE

## 2023-05-29 PROCEDURE — 96374 THER/PROPH/DIAG INJ IV PUSH: CPT | Mod: 59

## 2023-05-29 PROCEDURE — 96375 TX/PRO/DX INJ NEW DRUG ADDON: CPT

## 2023-05-29 PROCEDURE — 96361 HYDRATE IV INFUSION ADD-ON: CPT

## 2023-05-29 PROCEDURE — 82248 BILIRUBIN DIRECT: CPT | Performed by: EMERGENCY MEDICINE

## 2023-05-29 PROCEDURE — 83690 ASSAY OF LIPASE: CPT | Performed by: EMERGENCY MEDICINE

## 2023-05-29 PROCEDURE — 258N000003 HC RX IP 258 OP 636: Performed by: EMERGENCY MEDICINE

## 2023-05-29 PROCEDURE — 74177 CT ABD & PELVIS W/CONTRAST: CPT

## 2023-05-29 PROCEDURE — 99285 EMERGENCY DEPT VISIT HI MDM: CPT | Mod: 25

## 2023-05-29 PROCEDURE — 85027 COMPLETE CBC AUTOMATED: CPT | Performed by: EMERGENCY MEDICINE

## 2023-05-29 RX ORDER — ONDANSETRON 4 MG/1
4 TABLET, ORALLY DISINTEGRATING ORAL EVERY 6 HOURS PRN
Qty: 10 TABLET | Refills: 0 | Status: SHIPPED | OUTPATIENT
Start: 2023-05-29

## 2023-05-29 RX ORDER — ONDANSETRON 2 MG/ML
4 INJECTION INTRAMUSCULAR; INTRAVENOUS ONCE
Status: COMPLETED | OUTPATIENT
Start: 2023-05-29 | End: 2023-05-29

## 2023-05-29 RX ORDER — OXYCODONE HYDROCHLORIDE 5 MG/1
5 TABLET ORAL EVERY 6 HOURS PRN
Qty: 6 TABLET | Refills: 0 | Status: SHIPPED | OUTPATIENT
Start: 2023-05-29 | End: 2023-06-01

## 2023-05-29 RX ORDER — ONDANSETRON 2 MG/ML
8 INJECTION INTRAMUSCULAR; INTRAVENOUS ONCE
Status: COMPLETED | OUTPATIENT
Start: 2023-05-29 | End: 2023-05-29

## 2023-05-29 RX ORDER — HYDROMORPHONE HYDROCHLORIDE 1 MG/ML
0.5 INJECTION, SOLUTION INTRAMUSCULAR; INTRAVENOUS; SUBCUTANEOUS ONCE
Status: COMPLETED | OUTPATIENT
Start: 2023-05-29 | End: 2023-05-29

## 2023-05-29 RX ORDER — IOPAMIDOL 755 MG/ML
90 INJECTION, SOLUTION INTRAVASCULAR ONCE
Status: COMPLETED | OUTPATIENT
Start: 2023-05-29 | End: 2023-05-29

## 2023-05-29 RX ADMIN — HYDROMORPHONE HYDROCHLORIDE 0.5 MG: 1 INJECTION, SOLUTION INTRAMUSCULAR; INTRAVENOUS; SUBCUTANEOUS at 21:19

## 2023-05-29 RX ADMIN — FAMOTIDINE 20 MG: 10 INJECTION, SOLUTION INTRAVENOUS at 20:38

## 2023-05-29 RX ADMIN — SODIUM CHLORIDE 500 ML: 9 INJECTION, SOLUTION INTRAVENOUS at 19:37

## 2023-05-29 RX ADMIN — ONDANSETRON 8 MG: 2 INJECTION INTRAMUSCULAR; INTRAVENOUS at 19:36

## 2023-05-29 RX ADMIN — HYDROMORPHONE HYDROCHLORIDE 0.5 MG: 1 INJECTION, SOLUTION INTRAMUSCULAR; INTRAVENOUS; SUBCUTANEOUS at 19:36

## 2023-05-29 RX ADMIN — IOPAMIDOL 90 ML: 755 INJECTION, SOLUTION INTRAVENOUS at 20:48

## 2023-05-29 RX ADMIN — ONDANSETRON 4 MG: 2 INJECTION INTRAMUSCULAR; INTRAVENOUS at 22:46

## 2023-05-29 ASSESSMENT — ENCOUNTER SYMPTOMS
ABDOMINAL PAIN: 1
NAUSEA: 1
VOMITING: 1

## 2023-05-29 ASSESSMENT — ACTIVITIES OF DAILY LIVING (ADL): ADLS_ACUITY_SCORE: 35

## 2023-05-29 NOTE — TELEPHONE ENCOUNTER
"  Caller is wanting to speak with her \"surgeon or his nurses\"  Caller is  1 month post op  gastric sleeve bariatric surgery and reports onset this morning of  abdominal pain  and nausea ; has been advance to regular diet for  6 days and has been tolerating well. Pain is at  8/10 without any home analgesia.  Caller has emesis of yellow liquid during triage  But pain still present   Triage protocol reviewed   Caller is advised thaht she  most likely will need to be seen in ED; advised that she may wish to speak with on call for her surgeon prior and patient requests this.     Call transferred to  at  [facility].to page for  Surgeon to call patient direct.      Josey Gonzales RN  FNA          Reason for Disposition    Sounds like a serious complication to the triager    Patient sounds very sick or weak to the triager    Additional Information    Negative: Chest pain    Negative: Difficulty breathing    Negative: Acting confused (e.g., disoriented, slurred speech) or excessively sleepy    Negative: Post-Op tonsil and adenoid surgery, symptoms or questions about    Negative: Surgical incision symptoms and questions    Negative: [1] Pain or burning with passing urine (urination) AND [2] male    Negative: [1] Pain or burning with passing urine (urination) AND [2] female    Negative: Constipation    Negative: New or worsening leg (calf, thigh) pain    Negative: New or worsening leg swelling    Negative: Dizziness is severe, or persists > 24 hours after surgery    Negative: Pain, redness, swelling, or pus at IV Site    Negative: Symptoms arising from use of a urinary catheter (e.g., coude, Steele)    Negative: Cast problems or questions    Negative: Medication question    Negative: [1] Widespread rash AND [2] bright red, sunburn-like    Negative: [1] SEVERE headache AND [2] after spinal (epidural) anesthesia    Negative: [1] Vomiting AND [2] persists > 4 hours    Negative: [1] Vomiting AND [2] abdomen looks " much more swollen than usual    Negative: [1] Drinking very little AND [2] dehydration suspected (e.g., no urine > 12 hours, very dry mouth, very lightheaded)    Protocols used: POST-OP SYMPTOMS AND [id number]

## 2023-05-30 NOTE — ED PROVIDER NOTES
EMERGENCY DEPARTMENT ENCOUNTER      NAME: Nereida Torres  AGE: 22 year old female  YOB: 2000  MRN: [id number]  EVALUATION DATE & TIME: 5/29/2023  7:07 PM    PCP: Shila Reddy    ED PROVIDER: Nima Dunn M.D.      Chief Complaint   Patient presents with     Abdominal Pain         FINAL IMPRESSION:  1. Abdominal pain, epigastric    2. Abdominal pain, left lower quadrant    3. Ruptured ovarian cyst          ED COURSE & MEDICAL DECISION MAKING:    Pertinent Labs & Imaging studies reviewed below.  All EKGs below represent my independent interpretation.   ED Course as of 05/29/23 2347   Mon May 29, 2023   1925 Patient is a 22-year-old man is about 1 month status post laparoscopic Parviz-en-Y with Dr. Palacio, here with abdominal pain in the left lower quadrant and epigastric region that began suddenly and severely this morning.  She advance to regular diet 6 days ago and has had no complications up until this morning.  On arrival she is uncomfortable, blood pressures in the 140s, normal temperature.  She does not have a distended or firm abdomen, however she is tender in the epigastrium left lower quadrant.  Differential includes postprocedural pain due to stretching, anastomotic leak, pancreatitis.  To rule out pregnancy, give IV analgesia, antiemetics, fluids.  She will likely need imaging.   1960 HCG Qual Urine: Negative   1960 WBC: 5.2   2141 CT Abdomen Pelvis w Contrast  1.  Postsurgical changes of sleeve gastrectomy without evidence of complication.  2.  Abnormal position of intrauterine device.  3.  Small left ovarian cyst and trace free fluid in the pelvis, most likely physiologic but in the setting of left lower quadrant pain, could consider further evaluation with pelvic ultrasound.     Patient felt better after couple doses of Dilaudid here, Zofran.  Her pain began in the lower abdomen and given her ovarian cyst and evidence of recent ruptured cyst I suspect that the rupture was  the source of her initial pain.  CT scan does not show any evidence of postoperative complication.  Her pain now is the upper abdomen when she does experience it, and this is not consistent with ovarian torsion, I do not think she needs a dedicated pelvic ultrasound.    I provided reassurance, sent home with couple doses of oxycodone for breakthrough pain, encourage follow-up with her surgical team if symptoms are not improving.  She has an outpatient follow-up in about 2-3 weeks.    Additional ED Course Timestamps:  7:17 PM I met with the patient to gather history and perform my exam. ED course and treatment discussed.   10:30 PM I updated the patient with lab and imaging results and discussed the plan for discharge.       Medical Decision Making    History:    Supplemental history from: Documented in chart, if applicable    External Record(s) reviewed: Documented in chart, if applicable. and Inpatient Record: Admission on 4/18    Work Up:    Chart documentation includes differential considered and any EKGs or imaging independently interpreted by provider, where specified.    In additional to work up documented, I considered the following work up: Documented in chart, if applicable.    External consultation:    Discussion of management with another provider: Documented in chart, if applicable    Complicating factors:    Care impacted by chronic illness: N/A    Care affected by social determinants of health: Access to Medical Care    Disposition considerations: Discharge. I prescribed additional prescription strength medication(s) as charted. See documentation for any additional details.    At the conclusion of the encounter I discussed the results of all of the tests and the disposition. The questions were answered. The patient or family acknowledged understanding and was agreeable with the care plan.       MEDICATIONS GIVEN IN THE EMERGENCY:  Medications   ondansetron (ZOFRAN) injection 8 mg (8 mg Intravenous  $Given 5/29/23 1936)   0.9% sodium chloride BOLUS (0 mLs Intravenous Stopped 5/29/23 2244)   HYDROmorphone (PF) (DILAUDID) injection 0.5 mg (0.5 mg Intravenous $Given 5/29/23 1936)   famotidine (PEPCID) injection 20 mg (20 mg Intravenous $Given 5/29/23 2038)   iopamidol (ISOVUE-370) solution 90 mL (90 mLs Intravenous $Given 5/29/23 2048)   HYDROmorphone (PF) (DILAUDID) injection 0.5 mg (0.5 mg Intravenous $Given 5/29/23 2119)   ondansetron (ZOFRAN) injection 4 mg (4 mg Intravenous $Given 5/29/23 2246)         NEW PRESCRIPTIONS STARTED AT TODAY'S ER VISIT  Discharge Medication List as of 5/29/2023 10:45 PM      START taking these medications    Details   ondansetron (ZOFRAN ODT) 4 MG ODT tab Take 1 tablet (4 mg) by mouth every 6 hours as needed for nausea, Disp-10 tablet, R-0, E-Prescribe      oxyCODONE (ROXICODONE) 5 MG tablet Take 1 tablet (5 mg) by mouth every 6 hours as needed for pain, Disp-6 tablet, R-0, E-Prescribe                =================================================================    HPI    Patient information was obtained from: Patient     Use of : Yes (Phone) - Language Cameroonian        Nereida Torres is a 22 year old female with a pertinent history of morbid obesity and s/p laparoscopic gastric sleeve who presents to this ED for evaluation of abdominal pain     Per chart review: The patient had a laparoscopic sleeve gastrectomy at [facility] on 4/18/23. The patient's pain tolerated the procedure and was discharged tolerating a liquid diet.     The patient reports she started to feel left lower quadrant and epigastric abdominal pain this morning and has been constant pain since. The patient also reports an episode of emesis and some nausea as well. The patient notes she has been tolerating a normal diet for 6 days and was feeling well following surgery until today. The patient reports her last bowel movement was yesterday and she has been taking tylenol for the  "pain.          REVIEW OF SYSTEMS   Review of Systems   Gastrointestinal: Positive for abdominal pain (epigastric, LLQ), nausea and vomiting.   All other systems reviewed and are negative.       VITALS:  /64   Pulse 70   Temp 97.9  F (36.6  C) (Temporal)   Resp 20   Ht 1.626 m (5' 4\")   Wt 95.3 kg (210 lb)   SpO2 97%   BMI 36.05 kg/m      PHYSICAL EXAM    Constitutional: Well developed, well nourished. uncomfortable appearing.  HENT: Normocephalic, atraumatic, mucous membranes moist, nose normal. Neck- Supple, gross ROM intact.   Eyes: Pupils mid-range, conjunctiva without injection, no discharge.   Respiratory: Clear to auscultation bilaterally, no respiratory distress, no wheezing, speaks full sentences easily. No cough.  Cardiovascular: Normal heart rate, regular rhythm, no murmurs.   GI: Soft, no masses. Epigastric and left lower quadrant tenderness, well-healed laparoscopic incision.   Musculoskeletal: Moving all 4 extremities intentionally and without pain. No obvious deformity.  Skin: Warm, dry, no rash.  Neurologic: Alert & oriented x 3, cranial nerves grossly intact.  Psychiatric: Affect normal, cooperative.      PROCEDURES:         I, Alize Teague am serving as a scribe to document services personally performed by Dr. Nima Dunn based on my observation and the provider's statements to me. I, Nima Dunn MD attest that Alize Teague is acting in a scribe capacity, has observed my performance of the services and has documented them in accordance with my direction.    Nima Dunn M.D.  Emergency Medicine  [facility] EMERGENCY DEPARTMENT  [facility] [facility] [id number] [phone]  Dept: [phone]     Nima Dunn MD  05/29/23 2351    "

## 2023-05-30 NOTE — DISCHARGE INSTRUCTIONS
I suspect the pain in your lower abdomen was caused by ruptured ovarian cyst.  This will improve with time.  Tylenol will be helpful.  Warm packs on the abdomen.    The CT scan showed no complications with your surgery.  Reach out to your surgery team this coming week if you continue to have pain.    You can use the nausea and pain medicine for breakthrough pain, otherwise you should improve slowly over the next couple of days.

## 2023-07-18 ENCOUNTER — VIRTUAL VISIT (OUTPATIENT)
Dept: SURGERY | Facility: CLINIC | Age: 23
End: 2023-07-18
Payer: COMMERCIAL

## 2023-07-18 DIAGNOSIS — Z98.84 S/P BARIATRIC SURGERY: Primary | ICD-10-CM

## 2023-07-18 DIAGNOSIS — Z71.3 NUTRITIONAL COUNSELING: ICD-10-CM

## 2023-07-18 DIAGNOSIS — K90.9 INTESTINAL MALABSORPTION, UNSPECIFIED TYPE: ICD-10-CM

## 2023-07-18 PROCEDURE — 99024 POSTOP FOLLOW-UP VISIT: CPT | Mod: VID | Performed by: DIETITIAN, REGISTERED

## 2023-07-18 NOTE — LETTER
7/18/2023         RE: Nereida Torres  [address]  [place] [address]        Dear Colleague,    Thank you for referring your patient, Nereida Torres, to the [facility] [place]. Please see a copy of my visit note below.    Nereida Torres is a 22 year old who is being evaluated via a billable telephone visit.      What phone number would you like to be contacted at? [phone]  How would you like to obtain your AVS? MyChart      Post-op Surgical Weight Loss Diet Evaluation     Assessment:  Pt presents for 3 months post-op RD visit, s/p LSG on 4/18/2023 with Dr. Palacio. Today we reviewed current eating habits and level of physical activity, and instructed on the changes that are required for successful bariatric outcomes.    Patient Progress: dealing with some constipation    Initial weight: 230.7 lbs  Current weight: 193 lbs   Weight change: 37.7 lbs (down)    There is no height or weight on file to calculate BMI.    Patient Active Problem List   Diagnosis     Morbid obesity (H)     Fatigue     Morbid (severe) obesity due to excess calories (H)       Vitamins   Multi Vit with Iron: yes  Calcium Citrate: yes  B12: yes  D3: yes    Do you experience any reflux or discomfort with eating? On occasion-not typically   Constipation: yes  Hair loss:no    Diet Recall/Time:   Breakfast: eggs   Lunch: chicken   Dinner: Protein Bar    Typical Snacks: Protein Shake    Proteins/Veg/Fruits/CHO (NOT well tolerated): sometimes pork    Estimated protein intake: 60+ grams    Estimated portion size per meals:1/4-1/2 cup/meal    Meal Duration:20-30 minutes    Fluid-meal separation:  Fluids are  30min before and 30 minutes after meals.    Fluid Intake  Water: 1-2 Liters/day, Protein Water, Liquid IV, Flavored Water      Exercise: trying to look into gyms with a       PES statement:      (NC-1.4) Altered GI Function related to Alteration  "in gastrointestinal tract structure and/or function/ Decreased functional length of the GI tract as evidenced by Weight loss of 16% initial body weight; Gastric bypass surgery; sleeve gastrectomy    Intervention    Discussion  1. Discussed 3 Months Post-Op Nutritional Guidelines for LSG  2. Recommended to consume 15-20gm protein at 3 meals daily, along with protein supplement/\"planned protein containing snack\" of 15-30gm protein, to reach goal of 60-80 gm protein daily.  3. Educated on post-op vitamin regimen: Multi Vit + iron 2x/day, calcium citrate 400-600 mg 2x/day, 0163-8508 mcg of Sublingual B-12 daily, and 5000 IU Vitamin D3 daily (MVI and calcium can be taken at the same time BID)  4. Reviewed lean protein sources  5. Bariatric Plate Method-  including lean/low fat protein at each meal, including a vegetable/fruit, and limiting carbohydrate intake to less than 25% of plate volume.     Instructions  1. Include 15-20gm protein at each meal, along with protein supplement/\"planned protein containing snack\" of 15-30gm protein, to reach goal of 60-80 gm protein daily.  2. Increase fluid intake to 64oz daily: choose plain or calorie/carbonation-free beverages.  3. Incorporate daily structured activity, 30-60 minutes most days of the week  4. Recommended pt to start taking: Multi Vit + iron 2x/day, calcium citrate 400-600 mg 2x/day, 1788-6600 mcg of Sublingual B-12 daily, and 5000 IU Vitamin D3 daily. (MVI and calcium can be taken at the same time)  5. Read food labels more consistently: keeping total fat grams <10, total sugar grams <10, fiber >3gm per serving.  6. Increase vegetable/fruit intake, by having a vegetable or fruit with each meal daily.  7. Practice plate method: 1/2 plate lean/low fat protein source, vegetable/fruit, <25% of plate complex carbohydrates.  8. Separate fluids 30 minutes before/after meal times.  9. Practice eating off of smaller plates/bowls, chewing to applesauce consistency, taking " 20-30 minutes to eat in a calm/relaxed environment without distractions of tv/email/cell phone.    Handouts provided:  3 months Post-Op Nutritional Guidelines for LSG    Assessment/Plan:    Pt to follow up for 6 months post-op visit with bariatrician       Phone call duration: 13 minutes      Originating Location (pt. Location): Home        Distant Location (provider location):  Off-site      Emilia Malagon RD              Again, thank you for allowing me to participate in the care of your patient.        Sincerely,        Emilia Malagon RD

## 2023-07-18 NOTE — PROGRESS NOTES
Nereida Torres is a 22 year old who is being evaluated via a billable telephone visit.      What phone number would you like to be contacted at? [phone]  How would you like to obtain your AVS? Desean      Post-op Surgical Weight Loss Diet Evaluation     Assessment:  Pt presents for 3 months post-op RD visit, s/p LSG on 4/18/2023 with Dr. Palacio. Today we reviewed current eating habits and level of physical activity, and instructed on the changes that are required for successful bariatric outcomes.    Patient Progress: dealing with some constipation    Initial weight: 230.7 lbs  Current weight: 193 lbs   Weight change: 37.7 lbs (down)    There is no height or weight on file to calculate BMI.    Patient Active Problem List   Diagnosis     Morbid obesity (H)     Fatigue     Morbid (severe) obesity due to excess calories (H)       Vitamins   Multi Vit with Iron: yes  Calcium Citrate: yes  B12: yes  D3: yes    Do you experience any reflux or discomfort with eating? On occasion-not typically   Constipation: yes  Hair loss:no    Diet Recall/Time:   Breakfast: eggs   Lunch: chicken   Dinner: Protein Bar    Typical Snacks: Protein Shake    Proteins/Veg/Fruits/CHO (NOT well tolerated): sometimes pork    Estimated protein intake: 60+ grams    Estimated portion size per meals:1/4-1/2 cup/meal    Meal Duration:20-30 minutes    Fluid-meal separation:  Fluids are  30min before and 30 minutes after meals.    Fluid Intake  Water: 1-2 Liters/day, Protein Water, Liquid IV, Flavored Water      Exercise: trying to look into gyms with a       PES statement:      (NC-1.4) Altered GI Function related to Alteration in gastrointestinal tract structure and/or function/ Decreased functional length of the GI tract as evidenced by Weight loss of 16% initial body weight; Gastric bypass surgery; sleeve gastrectomy    Intervention    Discussion  1. Discussed 3 Months Post-Op Nutritional Guidelines for  "LSG  2. Recommended to consume 15-20gm protein at 3 meals daily, along with protein supplement/\"planned protein containing snack\" of 15-30gm protein, to reach goal of 60-80 gm protein daily.  3. Educated on post-op vitamin regimen: Multi Vit + iron 2x/day, calcium citrate 400-600 mg 2x/day, 9121-4233 mcg of Sublingual B-12 daily, and 5000 IU Vitamin D3 daily (MVI and calcium can be taken at the same time BID)  4. Reviewed lean protein sources  5. Bariatric Plate Method-  including lean/low fat protein at each meal, including a vegetable/fruit, and limiting carbohydrate intake to less than 25% of plate volume.     Instructions  1. Include 15-20gm protein at each meal, along with protein supplement/\"planned protein containing snack\" of 15-30gm protein, to reach goal of 60-80 gm protein daily.  2. Increase fluid intake to 64oz daily: choose plain or calorie/carbonation-free beverages.  3. Incorporate daily structured activity, 30-60 minutes most days of the week  4. Recommended pt to start taking: Multi Vit + iron 2x/day, calcium citrate 400-600 mg 2x/day, 6925-9808 mcg of Sublingual B-12 daily, and 5000 IU Vitamin D3 daily. (MVI and calcium can be taken at the same time)  5. Read food labels more consistently: keeping total fat grams <10, total sugar grams <10, fiber >3gm per serving.  6. Increase vegetable/fruit intake, by having a vegetable or fruit with each meal daily.  7. Practice plate method: 1/2 plate lean/low fat protein source, vegetable/fruit, <25% of plate complex carbohydrates.  8. Separate fluids 30 minutes before/after meal times.  9. Practice eating off of smaller plates/bowls, chewing to applesauce consistency, taking 20-30 minutes to eat in a calm/relaxed environment without distractions of tv/email/cell phone.    Handouts provided:  3 months Post-Op Nutritional Guidelines for LSG    Assessment/Plan:    Pt to follow up for 6 months post-op visit with bariatrician       Phone call duration: 13 " minutes      Originating Location (pt. Location): Home        Distant Location (provider location):  Off-site      Emilia Malagon RD

## 2023-07-26 NOTE — ED TRIAGE NOTES
Department of Anesthesiology  Postprocedure Note    Patient: Tarun Kerns  MRN: [id number]  YOB: 1966  Date of evaluation: 7/26/2023      Procedure Summary     Date: 07/26/23 Room / Location: [facility] ENDO FLOURO 1 / [facility] ENDOSCOPY    Anesthesia Start: 6587 Anesthesia Stop: 3623    Procedure: ERCP STENT INSERTION (Upper GI Region) Diagnosis:       Obstructive jaundice      (Obstructive jaundice [K83.1])    Surgeons: Carmelo Wallis MD Responsible Provider: Becky Frias DO    Anesthesia Type: general ASA Status: 4          Anesthesia Type: No value filed.     Kaylah Phase I: Kaylah Score: 9    Kaylah Phase II:        Anesthesia Post Evaluation    Patient location during evaluation: PACU  Level of consciousness: awake and alert  Airway patency: patent  Nausea & Vomiting: no nausea  Complications: no  Cardiovascular status: hemodynamically stable  Respiratory status: acceptable  Hydration status: euvolemic amb to triage.  Pt states having abd pain since this morning.  Generalized.  Last BM yesterday.  reports n/v.  States has not had period but using Merena BC.  Tylenol last 1500 without relief.  Last ate and drink yesterday due to nausea.  Also reports feeling very fatigued.      Triage Assessment     Row Name 05/29/23 5981       Triage Assessment (Adult)    Airway WDL WDL       Respiratory WDL    Respiratory WDL WDL       Skin Circulation/Temperature WDL    Skin Circulation/Temperature WDL WDL       Cardiac WDL    Cardiac WDL WDL       Peripheral/Neurovascular WDL    Peripheral Neurovascular WDL WDL       Cognitive/Neuro/Behavioral WDL    Cognitive/Neuro/Behavioral WDL WDL

## 2023-08-13 ENCOUNTER — HEALTH MAINTENANCE LETTER (OUTPATIENT)
Age: 23
End: 2023-08-13

## 2023-09-25 ENCOUNTER — MYC MEDICAL ADVICE (OUTPATIENT)
Dept: SURGERY | Facility: CLINIC | Age: 23
End: 2023-09-25
Payer: COMMERCIAL

## 2023-09-25 DIAGNOSIS — E66.01 MORBID OBESITY (H): ICD-10-CM

## 2023-09-25 DIAGNOSIS — K90.9 INTESTINAL MALABSORPTION, UNSPECIFIED TYPE: ICD-10-CM

## 2023-09-25 DIAGNOSIS — Z98.84 S/P BARIATRIC SURGERY: Primary | ICD-10-CM

## 2023-09-25 NOTE — TELEPHONE ENCOUNTER
Emilia Malagon, RD  P Bariatric Surgery [facility]  External 6 months post-op Sleeve labs needed for appointment with Liset Bhagat MD on 11/9/2023.

## 2024-10-06 ENCOUNTER — HEALTH MAINTENANCE LETTER (OUTPATIENT)
Age: 24
End: 2024-10-06

## (undated) DEVICE — SUTURE VICRYL+ 4-0 UNDYED PS-2 VCP496H

## (undated) DEVICE — ENDO TROCAR OPTICAL ACCESS KII Z-THRD 15X100MM C0R37

## (undated) DEVICE — DRESSING BANDAID SURFING CAT ABN4075D

## (undated) DEVICE — ENDO SHEARS RENEW LAP ENDOCUT SCISSOR TIP 16.5MM 3142

## (undated) DEVICE — ENDO TROCAR SLEEVE KII Z-THREADED 05X100MM CTS02

## (undated) DEVICE — ESU LIGASURE MARYLAND LAPAROSCOPIC SLR/DVDR 5MMX37CM LF1937

## (undated) DEVICE — CLIP LIGACLIP LG YELLOW LT400

## (undated) DEVICE — SYSTEM LAPAROVUE VISIBILITY LAPVUE10

## (undated) DEVICE — CUSTOM PACK LAP CHOLE SBA5BLCHEA

## (undated) DEVICE — GLOVE BIOGEL PI ULTRATOUCH G SZ 6.5 42165

## (undated) DEVICE — SU VICRYL+ 0 27 UR6 VLT VCP603H

## (undated) DEVICE — ENDO TROCAR OPTICAL ACCESS KII Z-THRD 05X100MM CTR03

## (undated) DEVICE — TUBING SMOKE EVAC PNEUMOCLEAR HIGH FLOW 0620050250

## (undated) DEVICE — STPL RELOAD REG/THK TISSUE ECHELON 60 X 3.8MM GOLD GST60D

## (undated) DEVICE — STPL POWERED ECHELON LONG 60MM PLEE60A

## (undated) DEVICE — DRAPE SURGICAL BARIATRIC 92INW X 110INW X 132INL 94590

## (undated) DEVICE — TUBE COLON 32FR 30IN LNG 080074200

## (undated) DEVICE — STPL RELOAD REG TISSUE ECHELON 60 X 3.6MM BLUE GST60B

## (undated) DEVICE — NEEDLE SPINAL DISP 22GA X 3.5" QUINCKE 333320

## (undated) DEVICE — PREP CHLORAPREP 26ML TINTED HI-LITE ORANGE 930815

## (undated) DEVICE — DECANTER VIAL 2006S

## (undated) DEVICE — Device

## (undated) DEVICE — SYR 30ML LL W/O NDL 302832

## (undated) DEVICE — STPL RELOAD LINEAR CUT ENDOPATH ECHELON 60MM BLK GST60T

## (undated) DEVICE — BANDAGE ADH LF 1X3 ABN3100A

## (undated) DEVICE — SOL WATER IRRIG 1000ML BOTTLE 2F7114

## (undated) DEVICE — GLOVE BIOGEL PI SZ 8.0 40880

## (undated) DEVICE — DRSG STERI STRIP 1/2X4" R1547

## (undated) DEVICE — SU SILK 0 SH 30" K834H

## (undated) DEVICE — GOWN IMPERVIOUS BREATHABLE SMART XLG 89045

## (undated) RX ORDER — PROPOFOL 10 MG/ML
INJECTION, EMULSION INTRAVENOUS
Status: DISPENSED
Start: 2023-04-18

## (undated) RX ORDER — BUPIVACAINE HYDROCHLORIDE AND EPINEPHRINE 2.5; 5 MG/ML; UG/ML
INJECTION, SOLUTION EPIDURAL; INFILTRATION; INTRACAUDAL; PERINEURAL
Status: DISPENSED
Start: 2023-04-18

## (undated) RX ORDER — DIPHENHYDRAMINE HYDROCHLORIDE 50 MG/ML
INJECTION INTRAMUSCULAR; INTRAVENOUS
Status: DISPENSED
Start: 2023-04-18

## (undated) RX ORDER — ONDANSETRON 2 MG/ML
INJECTION INTRAMUSCULAR; INTRAVENOUS
Status: DISPENSED
Start: 2023-04-18

## (undated) RX ORDER — GLYCOPYRROLATE 0.2 MG/ML
INJECTION, SOLUTION INTRAMUSCULAR; INTRAVENOUS
Status: DISPENSED
Start: 2023-04-18

## (undated) RX ORDER — DEXAMETHASONE SODIUM PHOSPHATE 10 MG/ML
INJECTION, SOLUTION INTRAMUSCULAR; INTRAVENOUS
Status: DISPENSED
Start: 2023-04-18

## (undated) RX ORDER — FENTANYL CITRATE-0.9 % NACL/PF 10 MCG/ML
PLASTIC BAG, INJECTION (ML) INTRAVENOUS
Status: DISPENSED
Start: 2023-04-18

## (undated) RX ORDER — FENTANYL CITRATE 50 UG/ML
INJECTION, SOLUTION INTRAMUSCULAR; INTRAVENOUS
Status: DISPENSED
Start: 2023-04-18

## (undated) RX ORDER — BUPIVACAINE HYDROCHLORIDE AND EPINEPHRINE 2.5; 5 MG/ML; UG/ML
INJECTION, SOLUTION INFILTRATION; PERINEURAL
Status: DISPENSED
Start: 2023-04-18

## (undated) RX ORDER — KETAMINE HYDROCHLORIDE 10 MG/ML
INJECTION INTRAMUSCULAR; INTRAVENOUS
Status: DISPENSED
Start: 2023-04-18